# Patient Record
Sex: FEMALE | Race: WHITE | NOT HISPANIC OR LATINO | Employment: OTHER | URBAN - METROPOLITAN AREA
[De-identification: names, ages, dates, MRNs, and addresses within clinical notes are randomized per-mention and may not be internally consistent; named-entity substitution may affect disease eponyms.]

---

## 2017-08-11 ENCOUNTER — OFFICE VISIT (OUTPATIENT)
Dept: URGENT CARE | Facility: CLINIC | Age: 62
End: 2017-08-11
Payer: COMMERCIAL

## 2017-08-11 ENCOUNTER — HOSPITAL ENCOUNTER (OUTPATIENT)
Facility: MEDICAL CENTER | Age: 62
End: 2017-08-11
Attending: PHYSICIAN ASSISTANT
Payer: COMMERCIAL

## 2017-08-11 VITALS
DIASTOLIC BLOOD PRESSURE: 84 MMHG | WEIGHT: 110 LBS | HEART RATE: 84 BPM | OXYGEN SATURATION: 95 % | SYSTOLIC BLOOD PRESSURE: 118 MMHG | HEIGHT: 65 IN | BODY MASS INDEX: 18.33 KG/M2 | RESPIRATION RATE: 14 BRPM | TEMPERATURE: 98.6 F

## 2017-08-11 DIAGNOSIS — N30.01 ACUTE CYSTITIS WITH HEMATURIA: ICD-10-CM

## 2017-08-11 DIAGNOSIS — F17.200 SMOKER: ICD-10-CM

## 2017-08-11 PROCEDURE — 87186 SC STD MICRODIL/AGAR DIL: CPT

## 2017-08-11 PROCEDURE — 99406 BEHAV CHNG SMOKING 3-10 MIN: CPT | Performed by: PHYSICIAN ASSISTANT

## 2017-08-11 PROCEDURE — 87086 URINE CULTURE/COLONY COUNT: CPT

## 2017-08-11 PROCEDURE — 99203 OFFICE O/P NEW LOW 30 MIN: CPT | Mod: 25 | Performed by: PHYSICIAN ASSISTANT

## 2017-08-11 PROCEDURE — 87077 CULTURE AEROBIC IDENTIFY: CPT

## 2017-08-11 RX ORDER — CEFDINIR 300 MG/1
300 CAPSULE ORAL EVERY 12 HOURS
Qty: 10 CAP | Refills: 0 | Status: SHIPPED | OUTPATIENT
Start: 2017-08-11 | End: 2017-08-16

## 2017-08-11 RX ORDER — ESTRADIOL 0.1 MG/G
CREAM VAGINAL DAILY
COMMUNITY
End: 2020-10-19 | Stop reason: SDUPTHER

## 2017-08-11 ASSESSMENT — ENCOUNTER SYMPTOMS
CHILLS: 0
HEADACHES: 0
EYE DISCHARGE: 0
EYE REDNESS: 0
SORE THROAT: 0
ROS GI COMMENTS: SUPRAPUBIC PRESSURE
VOMITING: 0
NECK PAIN: 0
FLANK PAIN: 0
COUGH: 0
WHEEZING: 0
MYALGIAS: 0
NAUSEA: 0
ABDOMINAL PAIN: 0
FATIGUE: 0
BACK PAIN: 0
VISUAL CHANGE: 0
FEVER: 0

## 2017-08-11 ASSESSMENT — PAIN SCALES - GENERAL: PAINLEVEL: NO PAIN

## 2017-08-11 NOTE — MR AVS SNAPSHOT
"        Pamela Watkins   2017 2:15 PM   Office Visit   MRN: [id number]    Department:  [facility]   Dept Phone:  [phone]    Description:  Female : 1955   Provider:  Papo Doran PA-C           Reason for Visit     UTI x5 days poss UTI      Allergies as of 2017     No Known Allergies      You were diagnosed with     Acute cystitis with hematuria   [805898]         Vital Signs     Blood Pressure Pulse Temperature Respirations Height Weight    118/84 mmHg 84 37 °C (98.6 °F) 14 1.651 m (5' 5\") 49.896 kg (110 lb)    Body Mass Index Oxygen Saturation Breastfeeding? Smoking Status          18.31 kg/m2 95% No Current Every Day Smoker        Basic Information     Date Of Birth Sex Race Ethnicity Preferred Language    1955 Female White Non- English      Health Maintenance     Patient has no pending health maintenance at this time      Current Immunizations     No immunizations on file.      Below and/or attached are the medications your provider expects you to take. Review all of your home medications and newly ordered medications with your provider and/or pharmacist. Follow medication instructions as directed by your provider and/or pharmacist. Please keep your medication list with you and share with your provider. Update the information when medications are discontinued, doses are changed, or new medications (including over-the-counter products) are added; and carry medication information at all times in the event of emergency situations     Allergies:  No Known Allergies          Medications  Valid as of: 2017 -  3:02 PM    Generic Name Brand Name Tablet Size Instructions for use    Cefdinir (Cap) OMNICEF 300 MG Take 1 Cap by mouth every 12 hours for 5 days.        Estradiol (Cream) ESTRACE 0.1 MG/GM Insert  in vagina every day.        .                 Medicines prescribed today were sent to:     [facility]ARM[facility]Y #[id number] - [place], [place] - [address]    [address] [facility] [address] [place] [address]    Phone: [phone] Fax: [phone]    Open 24 Hours?: No      Medication refill instructions:       If your prescription bottle indicates you have medication refills left, it is not necessary to call your provider’s office. Please contact your pharmacy and they will refill your medication.    If your prescription bottle indicates you do not have any refills left, you may request refills at any time through one of the following ways: The online USMD system (except Urgent Care), by calling your provider’s office, or by asking your pharmacy to contact your provider’s office with a refill request. Medication refills are processed only during regular business hours and may not be available until the next business day. Your provider may request additional information or to have a follow-up visit with you prior to refilling your medication.   *Please Note: Medication refills are assigned a new Rx number when refilled electronically. Your pharmacy may indicate that no refills were authorized even though a new prescription for the same medication is available at the pharmacy. Please request the medicine by name with the pharmacy before contacting your provider for a refill.        Your To Do List     Future Labs/Procedures Complete By Expires    Urine Culture  As directed 8/11/2018         USMD Access Code: [email or web address]  Expires: 9/10/2017  3:02 PM    Your email address is not on file at [facility].  Email Addresses are required for you to sign up for USMD, please contact [phone] to verify your personal information and to provide your email address prior to attempting to register for USMD.    Pamela Watkins  [address] [place] [address]    USMD  A secure, online tool to manage your health information     [facility]’s USMD® is a secure, online tool that connects you to your personalized health information from the privacy of your home -- day or night -  making it very easy for you to manage your healthcare. Once the activation process is completed, you can even access your medical information using the [employer] citlaly, which is available for free in the Apple Citlaly store or Google Play store.     To learn more about [employer], visit [email or web address]    There are two levels of access available (as shown below):   My Chart Features  [facility] Doctor [facility]  Specialists [facility]  Urgent  Care Non-[facility] Primary Care Doctor   Email your healthcare team securely and privately 24/7 X X X    Manage appointments: schedule your next appointment; view details of past/upcoming appointments X      Request prescription refills. X      View recent personal medical records, including lab and immunizations X X X X   View health record, including health history, allergies, medications X X X X   Read reports about your outpatient visits, procedures, consult and ER notes X X X X   See your discharge summary, which is a recap of your hospital and/or ER visit that includes your diagnosis, lab results, and care plan X X  X     How to register for [employer]:  Once your e-mail address has been verified, follow the following steps to sign up for [employer].     1. Go to  [email or web address]  2. Click on the Sign Up Now box, which takes you to the New Member Sign Up page. You will need to provide the following information:  a. Enter your [employer] Access Code exactly as it appears at the top of this page. (You will not need to use this code after you’ve completed the sign-up process. If you do not sign up before the expiration date, you must request a new code.)   b. Enter your date of birth.   c. Enter your home email address.   d. Click Submit, and follow the next screen’s instructions.  3. Create a Humble Bundlet ID. This will be your [employer] login ID and cannot be changed, so think of one that is secure and easy to remember.  4. Create a Hum[employer]let password. You can change your password at  any time.  5. Enter your Password Reset Question and Answer. This can be used at a later time if you forget your password.   6. Enter your e-mail address. This allows you to receive e-mail notifications when new information is available in [employer].  7. Click Sign Up. You can now view your health information.    For assistance activating your [employer] account, call [phone]         Quit Tobacco Information     Do you want to quit using tobacco?    Quitting tobacco decreases risks of cancer, heart and lung disease, increases life expectancy, improves sense of taste and smell, and increases spending money, among other benefits.    If you are thinking about quitting, we can help.  • [facility] Quit Tobacco Program: [phone]  o Program occurs weekly for four weeks and includes pharmacist consultation on products to support quitting smoking or chewing tobacco. A provider referral is needed for pharmacist consultation.  • Tobacco Users Help Hotline: 2-181-QUIT-NOW (165-1327) or https://[place].quitlogix.org/  o Free, confidential telephone and online coaching for [place] residents. Sessions are designed on a schedule that is convenient for you. Eligible clients receive free nicotine replacement therapy.  • Nationally: www.smokefree.gov  o Information and professional assistance to support both immediate and long-term needs as you become, and remain, a non-smoker. Smokefree.gov allows you to choose the help that best fits your needs.

## 2017-08-12 NOTE — PROGRESS NOTES
"Subjective:      Pamela Watkins is a 62 y.o. female who presents with UTI            UTI  This is a new problem. Episode onset: 5-6 days ago. The problem occurs constantly. The problem has been gradually worsening. Associated symptoms include urinary symptoms. Pertinent negatives include no abdominal pain, chest pain, chills, congestion, coughing, fatigue, fever, headaches, myalgias, nausea, neck pain, rash, sore throat, visual change or vomiting. Nothing aggravates the symptoms. Treatments tried: Azo.       Review of Systems   Constitutional: Negative for fever, chills, malaise/fatigue and fatigue.   HENT: Negative for congestion and sore throat.    Eyes: Negative for discharge and redness.   Respiratory: Negative for cough and wheezing.    Cardiovascular: Negative for chest pain and leg swelling.   Gastrointestinal: Negative for nausea, vomiting and abdominal pain.        Suprapubic pressure   Genitourinary: Positive for dysuria, urgency and frequency. Negative for hematuria and flank pain.   Musculoskeletal: Negative for myalgias, back pain and neck pain.   Skin: Negative for itching and rash.   Neurological: Negative for headaches.          Objective:     /84 mmHg  Pulse 84  Temp(Src) 37 °C (98.6 °F)  Resp 14  Ht 1.651 m (5' 5\")  Wt 49.896 kg (110 lb)  BMI 18.31 kg/m2  SpO2 95%  Breastfeeding? No   PMH:  has no past medical history on file.  MEDS:   Current outpatient prescriptions:   •  estradiol (ESTRACE) 0.1 MG/GM vaginal cream, Insert  in vagina every day., Disp: , Rfl:   •  cefdinir (OMNICEF) 300 MG Cap, Take 1 Cap by mouth every 12 hours for 5 days., Disp: 10 Cap, Rfl: 0  ALLERGIES: No Known Allergies  SURGHX: History reviewed. No pertinent past surgical history.  SOCHX:  reports that she has been smoking Cigarettes.  She has been smoking about 0.50 packs per day. She has never used smokeless tobacco. She reports that she does not drink alcohol or use illicit drugs.  FH: Family history was " reviewed, no pertinent findings to report    Physical Exam   Constitutional: She is oriented to person, place, and time. She appears well-developed.   HENT:   Head: Normocephalic and atraumatic.   Eyes: EOM are normal. Pupils are equal, round, and reactive to light.   Neck: Normal range of motion. Neck supple.   Cardiovascular: Normal rate and regular rhythm.    No murmur heard.  Pulmonary/Chest: Effort normal and breath sounds normal. No respiratory distress.   Abdominal: Soft. Bowel sounds are normal. She exhibits no distension.   Minimal suprapubic tenderness. Negative CVAT.    Musculoskeletal: Normal range of motion. She exhibits no edema.   Neurological: She is alert and oriented to person, place, and time.   Skin: Skin is warm and dry.   Psychiatric: She has a normal mood and affect. Her behavior is normal.   Vitals reviewed.           UA: Large LE, nitrate pos, greater than 2000 protein, moderate blood.   Sent for culture.     Assessment/Plan:     1. Acute cystitis with hematuria [N30.01]  - Urine Culture; Future  - cefdinir (OMNICEF) 300 MG Cap; Take 1 Cap by mouth every 12 hours for 5 days.  Dispense: 10 Cap; Refill: 0    2. Smoker  Smoking cessation was discussed today for longer than 3 min. OTC Smoking cessation aids were discussed and pt. will consider such, however is not ready to quit at this time.     Pt. Was given ABX therapy today and will change therapy if culture indicates this is necessary. ER precautions given- worsening symptoms, back pain, abd. Pain, or fevers. Pt. Without any evidence of pyelonephritis at this time- encouraged pt. To have her urine rechecked at her upcoming regular appt. With her PCP - to ensure infection- but protein and blood have cleared.   Pt. Is to increase fluids, and take the complete duration of the therapy.   Pt. Understands and agrees with the plan.   F/U with PCP in 3-4 days as needed.

## 2017-08-14 LAB
BACTERIA UR CULT: ABNORMAL
SIGNIFICANT IND 70042: ABNORMAL
SOURCE SOURCE: ABNORMAL

## 2020-09-21 ENCOUNTER — TELEPHONE (OUTPATIENT)
Dept: SCHEDULING | Facility: IMAGING CENTER | Age: 65
End: 2020-09-21

## 2020-10-19 ENCOUNTER — OFFICE VISIT (OUTPATIENT)
Dept: MEDICAL GROUP | Facility: PHYSICIAN GROUP | Age: 65
End: 2020-10-19
Payer: MEDICARE

## 2020-10-19 VITALS
OXYGEN SATURATION: 95 % | DIASTOLIC BLOOD PRESSURE: 62 MMHG | SYSTOLIC BLOOD PRESSURE: 120 MMHG | WEIGHT: 110.4 LBS | HEIGHT: 65 IN | BODY MASS INDEX: 18.39 KG/M2 | RESPIRATION RATE: 12 BRPM | TEMPERATURE: 97.7 F | HEART RATE: 74 BPM

## 2020-10-19 DIAGNOSIS — F17.200 SMOKER: ICD-10-CM

## 2020-10-19 DIAGNOSIS — E78.2 MIXED HYPERLIPIDEMIA: ICD-10-CM

## 2020-10-19 DIAGNOSIS — Z12.31 ENCOUNTER FOR SCREENING MAMMOGRAM FOR BREAST CANCER: ICD-10-CM

## 2020-10-19 DIAGNOSIS — Z12.11 SCREENING FOR COLON CANCER: ICD-10-CM

## 2020-10-19 DIAGNOSIS — Z13.820 SCREENING FOR OSTEOPOROSIS: ICD-10-CM

## 2020-10-19 DIAGNOSIS — Z13.29 THYROID DISORDER SCREEN: ICD-10-CM

## 2020-10-19 DIAGNOSIS — Z00.00 PHYSICAL EXAM, ANNUAL: ICD-10-CM

## 2020-10-19 DIAGNOSIS — N95.2 VAGINAL ATROPHY: ICD-10-CM

## 2020-10-19 PROCEDURE — 99214 OFFICE O/P EST MOD 30 MIN: CPT | Performed by: PHYSICIAN ASSISTANT

## 2020-10-19 RX ORDER — ATORVASTATIN CALCIUM 40 MG/1
20 TABLET, FILM COATED ORAL DAILY
Qty: 45 TAB | Refills: 3 | Status: SHIPPED | OUTPATIENT
Start: 2020-10-19 | End: 2020-10-19

## 2020-10-19 RX ORDER — ESTRADIOL 0.1 MG/G
CREAM VAGINAL
Qty: 42 G | Refills: 2 | Status: SHIPPED | OUTPATIENT
Start: 2020-10-19 | End: 2021-07-12

## 2020-10-19 RX ORDER — ATORVASTATIN CALCIUM 40 MG/1
20 TABLET, FILM COATED ORAL NIGHTLY
COMMUNITY
End: 2020-10-19 | Stop reason: SDUPTHER

## 2020-10-19 RX ORDER — ATORVASTATIN CALCIUM 20 MG/1
20 TABLET, FILM COATED ORAL DAILY
Qty: 90 TAB | Refills: 3 | Status: SHIPPED | OUTPATIENT
Start: 2020-10-19 | End: 2021-10-12

## 2020-10-19 ASSESSMENT — PATIENT HEALTH QUESTIONNAIRE - PHQ9: CLINICAL INTERPRETATION OF PHQ2 SCORE: 0

## 2020-10-19 NOTE — PROGRESS NOTES
CC:    Chief Complaint   Patient presents with   • Establish Care   • Medication Refill       HISTORY OF THE PRESENT ILLNESS: Patient is a 65 y.o. female presenting to [facility] primary care. Pt spends half of the year in [place].     1. Pt on lipitor 20mg for HLD.   2. Pt on estrace for vaginal atrophy.   3. Pt declines vaccines.   4. Pt stopped doing mammos about 10 years ago.   5. Pt's last colon CA screening at age 50.   6. Pt went into menopause at age 33.       Allergies: Patient has no known allergies.    Current Outpatient Medications Ordered in Epic   Medication Sig Dispense Refill   • atorvastatin (LIPITOR) 40 MG Tab Take 20 mg by mouth every evening.     • estradiol (ESTRACE) 0.1 MG/GM vaginal cream Insert  in vagina every day.       No current [facility]-ordered facility-administered medications on file.        Past Medical History:   Diagnosis Date   • Hyperlipidemia        Past Surgical History:   Procedure Laterality Date   • TUBAL LIGATION         Social History     Tobacco Use   • Smoking status: Current Every Day Smoker     Packs/day: 0.50     Years: 50.00     Pack years: 25.00     Types: Cigarettes   • Smokeless tobacco: Never Used   Substance Use Topics   • Alcohol use: No   • Drug use: No       Social History     Social History Narrative   • Not on file       History reviewed. No pertinent family history.    ROS:     - Constitutional: Negative for fever, chills, unexpected weight change, and fatigue/generalized weakness.     - HEENT: Negative for headaches, vision changes, hearing changes, ear pain, ear discharge, rhinorrhea, sinus congestion, sore throat, and neck pain.      - Respiratory: Negative for cough, sputum production, chest congestion, dyspnea, wheezing, and crackles.      - Cardiovascular: Negative for chest pain, palpitations, orthopnea, and bilateral lower extremity edema.     - Gastrointestinal: Negative for heartburn, nausea, vomiting, abdominal pain, hematochezia, melena, diarrhea,  "constipation, and greasy/foul-smelling stools.     - Genitourinary: Negative for dysuria, polyuria, hematuria, pyuria, urinary urgency, and urinary incontinence.     - Musculoskeletal: Negative for myalgias, back pain, and joint pain.     - Skin: Negative for rash, itching, cyanotic skin color change.     - Neurological: Positive for occasional vertigo. Negative for dizziness, tingling, tremors, focal sensory deficit, focal weakness and headaches.     - Endo/Heme/Allergies: Does not bruise/bleed easily.     - Psychiatric/Behavioral: Negative for depression, suicidal/homicidal ideation and memory loss.        - NOTE: All other systems reviewed and are negative, except as in HPI.            Exam: /62 (BP Location: Left arm, Patient Position: Sitting, BP Cuff Size: Adult)   Pulse 74   Temp 36.5 °C (97.7 °F) (Temporal)   Resp 12   Ht 1.651 m (5' 5\")   Wt 50.1 kg (110 lb 6.4 oz)   SpO2 95%  Body mass index is 18.37 kg/m².    General: Normal appearing. No acute distress.  Skin: Warm and dry.  No obvious lesions.  HEENT: Normocephalic. Eyes conjunctiva clear lids without ptosis, ears normal shape and contour  Cardiovascular: Regular rate and rhythm without murmur.   Respiratory: Clear to auscultation bilaterally, no rhonchi wheezing or rales.  Neurologic: Grossly nonfocal, A&O x3, gait normal,  Musculoskeletal: No deformity or swelling.   Extremities: No extremity cyanosis, clubbing, or edema.  Psych: Normal mood and affect. Judgment and insight is normal.    Please note that this dictation was created using voice recognition software. I have made every reasonable attempt to correct obvious errors, but I expect that there are errors of grammar and possibly content that I did not discover before finalizing the note.      Assessment/Plan  1. Screening for colon cancer  Cologuard order placed  - COLOGUARD (FIT DNA)    2. Vaginal atrophy  Refill of Estrace sent in.  - estradiol (ESTRACE) 0.1 MG/GM vaginal cream; " Apply 1 gram vaginally 1 to 3 times per week  Dispense: 42 g; Refill: 2    3. Encounter for screening mammogram for breast cancer  Order sent  - MA-SCREENING MAMMO BILAT W/TOMOSYNTHESIS W/CAD; Future    4. Screening for osteoporosis  Order sent  - DS-BONE DENSITY STUDY (DEXA); Future    5. Mixed hyperlipidemia  We will check her lipids and follow-up as needed.  - CBC WITH DIFFERENTIAL; Future  - Comp Metabolic Panel; Future  - Lipid Profile; Future  - atorvastatin (LIPITOR) 20 MG Tab; Take 1 Tab by mouth every day.  Dispense: 90 Tab; Refill: 3    6. Thyroid disorder screen  Order sent  - TSH WITH REFLEX TO FT4; Future    7. Physical exam, annual  PE conducted  - CBC WITH DIFFERENTIAL; Future  - Comp Metabolic Panel; Future  - Lipid Profile; Future  - TSH WITH REFLEX TO FT4; Future

## 2020-12-02 ENCOUNTER — HOSPITAL ENCOUNTER (OUTPATIENT)
Dept: RADIOLOGY | Facility: MEDICAL CENTER | Age: 65
End: 2020-12-02
Attending: PHYSICIAN ASSISTANT
Payer: MEDICARE

## 2020-12-02 DIAGNOSIS — Z78.0 POSTMENOPAUSAL STATUS: ICD-10-CM

## 2020-12-02 DIAGNOSIS — Z12.31 ENCOUNTER FOR SCREENING MAMMOGRAM FOR BREAST CANCER: ICD-10-CM

## 2020-12-02 PROCEDURE — 77080 DXA BONE DENSITY AXIAL: CPT

## 2020-12-02 PROCEDURE — 77067 SCR MAMMO BI INCL CAD: CPT

## 2020-12-03 DIAGNOSIS — Z13.29 THYROID DISORDER SCREEN: ICD-10-CM

## 2020-12-03 DIAGNOSIS — E78.2 MIXED HYPERLIPIDEMIA: ICD-10-CM

## 2020-12-03 DIAGNOSIS — Z00.00 PHYSICAL EXAM, ANNUAL: ICD-10-CM

## 2021-03-03 DIAGNOSIS — Z23 NEED FOR VACCINATION: ICD-10-CM

## 2021-07-11 DIAGNOSIS — N95.2 VAGINAL ATROPHY: ICD-10-CM

## 2021-07-12 RX ORDER — ESTRADIOL 0.1 MG/G
CREAM VAGINAL
Qty: 42.5 G | Refills: 2 | Status: SHIPPED | OUTPATIENT
Start: 2021-07-12 | End: 2021-07-25

## 2021-07-25 ENCOUNTER — APPOINTMENT (OUTPATIENT)
Dept: RADIOLOGY | Facility: MEDICAL CENTER | Age: 66
DRG: 482 | End: 2021-07-25
Attending: EMERGENCY MEDICINE
Payer: MEDICARE

## 2021-07-25 ENCOUNTER — APPOINTMENT (OUTPATIENT)
Dept: RADIOLOGY | Facility: MEDICAL CENTER | Age: 66
DRG: 482 | End: 2021-07-25
Attending: ORTHOPAEDIC SURGERY
Payer: MEDICARE

## 2021-07-25 ENCOUNTER — ANESTHESIA (OUTPATIENT)
Dept: SURGERY | Facility: MEDICAL CENTER | Age: 66
DRG: 482 | End: 2021-07-25
Payer: MEDICARE

## 2021-07-25 ENCOUNTER — ANESTHESIA EVENT (OUTPATIENT)
Dept: SURGERY | Facility: MEDICAL CENTER | Age: 66
DRG: 482 | End: 2021-07-25
Payer: MEDICARE

## 2021-07-25 ENCOUNTER — HOSPITAL ENCOUNTER (INPATIENT)
Facility: MEDICAL CENTER | Age: 66
LOS: 3 days | DRG: 482 | End: 2021-07-28
Attending: EMERGENCY MEDICINE | Admitting: INTERNAL MEDICINE
Payer: MEDICARE

## 2021-07-25 DIAGNOSIS — S72.001A CLOSED FRACTURE OF RIGHT HIP, INITIAL ENCOUNTER (HCC): ICD-10-CM

## 2021-07-25 DIAGNOSIS — J43.9 PULMONARY EMPHYSEMA, UNSPECIFIED EMPHYSEMA TYPE (HCC): ICD-10-CM
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
## 2021-07-25 PROBLEM — S72.90XA FEMUR FRACTURE (HCC): Status: ACTIVE | Noted: 2021-07-25

## 2021-07-25 LAB
ALBUMIN SERPL BCP-MCNC: 4 G/DL (ref 3.2–4.9)
ALBUMIN/GLOB SERPL: 1.8 G/DL
ALP SERPL-CCNC: 78 U/L (ref 30–99)
ALT SERPL-CCNC: 17 U/L (ref 2–50)
ANION GAP SERPL CALC-SCNC: 9 MMOL/L (ref 7–16)
AST SERPL-CCNC: 22 U/L (ref 12–45)
BASOPHILS # BLD AUTO: 0.5 % (ref 0–1.8)
BASOPHILS # BLD: 0.04 K/UL (ref 0–0.12)
BILIRUB SERPL-MCNC: 0.3 MG/DL (ref 0.1–1.5)
BUN SERPL-MCNC: 18 MG/DL (ref 8–22)
CALCIUM SERPL-MCNC: 9 MG/DL (ref 8.5–10.5)
CHLORIDE SERPL-SCNC: 104 MMOL/L (ref 96–112)
CO2 SERPL-SCNC: 26 MMOL/L (ref 20–33)
CREAT SERPL-MCNC: 0.78 MG/DL (ref 0.5–1.4)
EOSINOPHIL # BLD AUTO: 0.07 K/UL (ref 0–0.51)
EOSINOPHIL NFR BLD: 0.8 % (ref 0–6.9)
ERYTHROCYTE [DISTWIDTH] IN BLOOD BY AUTOMATED COUNT: 44.5 FL (ref 35.9–50)
GLOBULIN SER CALC-MCNC: 2.2 G/DL (ref 1.9–3.5)
GLUCOSE SERPL-MCNC: 107 MG/DL (ref 65–99)
HCT VFR BLD AUTO: 38.1 % (ref 37–47)
HGB BLD-MCNC: 12.6 G/DL (ref 12–16)
IMM GRANULOCYTES # BLD AUTO: 0.06 K/UL (ref 0–0.11)
IMM GRANULOCYTES NFR BLD AUTO: 0.7 % (ref 0–0.9)
LYMPHOCYTES # BLD AUTO: 1.37 K/UL (ref 1–4.8)
LYMPHOCYTES NFR BLD: 15.5 % (ref 22–41)
MCH RBC QN AUTO: 30.6 PG (ref 27–33)
MCHC RBC AUTO-ENTMCNC: 33.1 G/DL (ref 33.6–35)
MCV RBC AUTO: 92.5 FL (ref 81.4–97.8)
MONOCYTES # BLD AUTO: 0.67 K/UL (ref 0–0.85)
MONOCYTES NFR BLD AUTO: 7.6 % (ref 0–13.4)
NEUTROPHILS # BLD AUTO: 6.65 K/UL (ref 2–7.15)
NEUTROPHILS NFR BLD: 74.9 % (ref 44–72)
NRBC # BLD AUTO: 0 K/UL
NRBC BLD-RTO: 0 /100 WBC
PLATELET # BLD AUTO: 229 K/UL (ref 164–446)
PMV BLD AUTO: 10 FL (ref 9–12.9)
POTASSIUM SERPL-SCNC: 3.8 MMOL/L (ref 3.6–5.5)
PROT SERPL-MCNC: 6.2 G/DL (ref 6–8.2)
RBC # BLD AUTO: 4.12 M/UL (ref 4.2–5.4)
SARS-COV+SARS-COV-2 AG RESP QL IA.RAPID: NOTDETECTED
SODIUM SERPL-SCNC: 139 MMOL/L (ref 135–145)
SPECIMEN SOURCE: NORMAL
WBC # BLD AUTO: 8.9 K/UL (ref 4.8–10.8)

## 2021-07-25 PROCEDURE — 87426 SARSCOV CORONAVIRUS AG IA: CPT

## 2021-07-25 PROCEDURE — 160009 HCHG ANES TIME/MIN: Performed by: ORTHOPAEDIC SURGERY

## 2021-07-25 PROCEDURE — 27245 TREAT THIGH FRACTURE: CPT | Mod: RT | Performed by: ORTHOPAEDIC SURGERY

## 2021-07-25 PROCEDURE — 700111 HCHG RX REV CODE 636 W/ 250 OVERRIDE (IP): Performed by: ORTHOPAEDIC SURGERY

## 2021-07-25 PROCEDURE — 160002 HCHG RECOVERY MINUTES (STAT): Performed by: ORTHOPAEDIC SURGERY

## 2021-07-25 PROCEDURE — A9270 NON-COVERED ITEM OR SERVICE: HCPCS | Performed by: ORTHOPAEDIC SURGERY

## 2021-07-25 PROCEDURE — A9270 NON-COVERED ITEM OR SERVICE: HCPCS | Performed by: ANESTHESIOLOGY

## 2021-07-25 PROCEDURE — 99221 1ST HOSP IP/OBS SF/LOW 40: CPT | Mod: AI | Performed by: INTERNAL MEDICINE

## 2021-07-25 PROCEDURE — 160035 HCHG PACU - 1ST 60 MINS PHASE I: Performed by: ORTHOPAEDIC SURGERY

## 2021-07-25 PROCEDURE — 85025 COMPLETE CBC W/AUTO DIFF WBC: CPT

## 2021-07-25 PROCEDURE — 73552 X-RAY EXAM OF FEMUR 2/>: CPT | Mod: RT

## 2021-07-25 PROCEDURE — 27245 TREAT THIGH FRACTURE: CPT | Mod: 80ROC,RT | Performed by: ORTHOPAEDIC SURGERY

## 2021-07-25 PROCEDURE — 700101 HCHG RX REV CODE 250: Performed by: ANESTHESIOLOGY

## 2021-07-25 PROCEDURE — 500891 HCHG PACK, ORTHO MAJOR: Performed by: ORTHOPAEDIC SURGERY

## 2021-07-25 PROCEDURE — 700111 HCHG RX REV CODE 636 W/ 250 OVERRIDE (IP): Performed by: EMERGENCY MEDICINE

## 2021-07-25 PROCEDURE — 700111 HCHG RX REV CODE 636 W/ 250 OVERRIDE (IP): Performed by: ANESTHESIOLOGY

## 2021-07-25 PROCEDURE — 700105 HCHG RX REV CODE 258: Performed by: INTERNAL MEDICINE

## 2021-07-25 PROCEDURE — 72170 X-RAY EXAM OF PELVIS: CPT

## 2021-07-25 PROCEDURE — 160041 HCHG SURGERY MINUTES - EA ADDL 1 MIN LEVEL 4: Performed by: ORTHOPAEDIC SURGERY

## 2021-07-25 PROCEDURE — 99222 1ST HOSP IP/OBS MODERATE 55: CPT | Mod: 57 | Performed by: ORTHOPAEDIC SURGERY

## 2021-07-25 PROCEDURE — 501838 HCHG SUTURE GENERAL: Performed by: ORTHOPAEDIC SURGERY

## 2021-07-25 PROCEDURE — 160029 HCHG SURGERY MINUTES - 1ST 30 MINS LEVEL 4: Performed by: ORTHOPAEDIC SURGERY

## 2021-07-25 PROCEDURE — 700102 HCHG RX REV CODE 250 W/ 637 OVERRIDE(OP): Performed by: ANESTHESIOLOGY

## 2021-07-25 PROCEDURE — 770006 HCHG ROOM/CARE - MED/SURG/GYN SEMI*

## 2021-07-25 PROCEDURE — C1713 ANCHOR/SCREW BN/BN,TIS/BN: HCPCS | Performed by: ORTHOPAEDIC SURGERY

## 2021-07-25 PROCEDURE — 160036 HCHG PACU - EA ADDL 30 MINS PHASE I: Performed by: ORTHOPAEDIC SURGERY

## 2021-07-25 PROCEDURE — 99291 CRITICAL CARE FIRST HOUR: CPT

## 2021-07-25 PROCEDURE — 0QS636Z REPOSITION RIGHT UPPER FEMUR WITH INTRAMEDULLARY INTERNAL FIXATION DEVICE, PERCUTANEOUS APPROACH: ICD-10-PCS | Performed by: ORTHOPAEDIC SURGERY

## 2021-07-25 PROCEDURE — 160048 HCHG OR STATISTICAL LEVEL 1-5: Performed by: ORTHOPAEDIC SURGERY

## 2021-07-25 PROCEDURE — 80053 COMPREHEN METABOLIC PANEL: CPT

## 2021-07-25 PROCEDURE — 96374 THER/PROPH/DIAG INJ IV PUSH: CPT

## 2021-07-25 PROCEDURE — 36415 COLL VENOUS BLD VENIPUNCTURE: CPT

## 2021-07-25 PROCEDURE — 93005 ELECTROCARDIOGRAM TRACING: CPT | Performed by: ORTHOPAEDIC SURGERY

## 2021-07-25 PROCEDURE — 700102 HCHG RX REV CODE 250 W/ 637 OVERRIDE(OP): Performed by: ORTHOPAEDIC SURGERY

## 2021-07-25 PROCEDURE — 96375 TX/PRO/DX INJ NEW DRUG ADDON: CPT

## 2021-07-25 PROCEDURE — 110371 HCHG SHELL REV 272: Performed by: ORTHOPAEDIC SURGERY

## 2021-07-25 DEVICE — NAIL HIP 127.5 DEGREE 10MM X 180MM (4TX2=8): Type: IMPLANTABLE DEVICE | Site: HIP | Status: FUNCTIONAL

## 2021-07-25 DEVICE — SCREW CROSS LOCK 5MM X 27.5MM (4TX5=20): Type: IMPLANTABLE DEVICE | Site: HIP | Status: FUNCTIONAL

## 2021-07-25 DEVICE — SCREW LAG 10.5MM X 95MM (4TX2=8): Type: IMPLANTABLE DEVICE | Site: HIP | Status: FUNCTIONAL

## 2021-07-25 RX ORDER — ACETAMINOPHEN 325 MG/1
650 TABLET ORAL EVERY 6 HOURS PRN
Status: DISCONTINUED | OUTPATIENT
Start: 2021-07-25 | End: 2021-07-26

## 2021-07-25 RX ORDER — CEFAZOLIN SODIUM 1 G/3ML
INJECTION, POWDER, FOR SOLUTION INTRAMUSCULAR; INTRAVENOUS PRN
Status: DISCONTINUED | OUTPATIENT
Start: 2021-07-25 | End: 2021-07-25 | Stop reason: SURG

## 2021-07-25 RX ORDER — BISACODYL 10 MG
10 SUPPOSITORY, RECTAL RECTAL
Status: DISCONTINUED | OUTPATIENT
Start: 2021-07-25 | End: 2021-07-28 | Stop reason: HOSPADM

## 2021-07-25 RX ORDER — OXYCODONE HCL 5 MG/5 ML
10 SOLUTION, ORAL ORAL
Status: COMPLETED | OUTPATIENT
Start: 2021-07-25 | End: 2021-07-25

## 2021-07-25 RX ORDER — LIDOCAINE HYDROCHLORIDE 20 MG/ML
INJECTION, SOLUTION EPIDURAL; INFILTRATION; INTRACAUDAL; PERINEURAL PRN
Status: DISCONTINUED | OUTPATIENT
Start: 2021-07-25 | End: 2021-07-25 | Stop reason: SURG

## 2021-07-25 RX ORDER — ONDANSETRON 2 MG/ML
4 INJECTION INTRAMUSCULAR; INTRAVENOUS ONCE
Status: COMPLETED | OUTPATIENT
Start: 2021-07-25 | End: 2021-07-25

## 2021-07-25 RX ORDER — HYDROMORPHONE HYDROCHLORIDE 1 MG/ML
0.5 INJECTION, SOLUTION INTRAMUSCULAR; INTRAVENOUS; SUBCUTANEOUS
Status: DISCONTINUED | OUTPATIENT
Start: 2021-07-25 | End: 2021-07-25

## 2021-07-25 RX ORDER — AMOXICILLIN 250 MG
2 CAPSULE ORAL 2 TIMES DAILY
Status: DISCONTINUED | OUTPATIENT
Start: 2021-07-26 | End: 2021-07-28 | Stop reason: HOSPADM

## 2021-07-25 RX ORDER — PHENYLEPHRINE HCL IN 0.9% NACL 0.5 MG/5ML
SYRINGE (ML) INTRAVENOUS PRN
Status: DISCONTINUED | OUTPATIENT
Start: 2021-07-25 | End: 2021-07-25 | Stop reason: SURG

## 2021-07-25 RX ORDER — SODIUM CHLORIDE, SODIUM LACTATE, POTASSIUM CHLORIDE, CALCIUM CHLORIDE 600; 310; 30; 20 MG/100ML; MG/100ML; MG/100ML; MG/100ML
INJECTION, SOLUTION INTRAVENOUS CONTINUOUS
Status: DISCONTINUED | OUTPATIENT
Start: 2021-07-25 | End: 2021-07-25 | Stop reason: HOSPADM

## 2021-07-25 RX ORDER — DEXAMETHASONE SODIUM PHOSPHATE 4 MG/ML
INJECTION, SOLUTION INTRA-ARTICULAR; INTRALESIONAL; INTRAMUSCULAR; INTRAVENOUS; SOFT TISSUE PRN
Status: DISCONTINUED | OUTPATIENT
Start: 2021-07-25 | End: 2021-07-25 | Stop reason: SURG

## 2021-07-25 RX ORDER — ATORVASTATIN CALCIUM 20 MG/1
20 TABLET, FILM COATED ORAL EVERY EVENING
Status: DISCONTINUED | OUTPATIENT
Start: 2021-07-25 | End: 2021-07-28 | Stop reason: HOSPADM

## 2021-07-25 RX ORDER — POLYETHYLENE GLYCOL 3350 17 G/17G
1 POWDER, FOR SOLUTION ORAL
Status: DISCONTINUED | OUTPATIENT
Start: 2021-07-25 | End: 2021-07-28 | Stop reason: HOSPADM

## 2021-07-25 RX ORDER — HYDROMORPHONE HYDROCHLORIDE 1 MG/ML
0.2 INJECTION, SOLUTION INTRAMUSCULAR; INTRAVENOUS; SUBCUTANEOUS
Status: DISCONTINUED | OUTPATIENT
Start: 2021-07-25 | End: 2021-07-25 | Stop reason: HOSPADM

## 2021-07-25 RX ORDER — SODIUM CHLORIDE, SODIUM LACTATE, POTASSIUM CHLORIDE, CALCIUM CHLORIDE 600; 310; 30; 20 MG/100ML; MG/100ML; MG/100ML; MG/100ML
INJECTION, SOLUTION INTRAVENOUS CONTINUOUS
Status: DISCONTINUED | OUTPATIENT
Start: 2021-07-25 | End: 2021-07-26

## 2021-07-25 RX ORDER — HYDROMORPHONE HYDROCHLORIDE 1 MG/ML
0.25 INJECTION, SOLUTION INTRAMUSCULAR; INTRAVENOUS; SUBCUTANEOUS
Status: DISCONTINUED | OUTPATIENT
Start: 2021-07-25 | End: 2021-07-28 | Stop reason: HOSPADM

## 2021-07-25 RX ORDER — CEFAZOLIN SODIUM 2 G/100ML
2 INJECTION, SOLUTION INTRAVENOUS EVERY 8 HOURS
Status: COMPLETED | OUTPATIENT
Start: 2021-07-25 | End: 2021-07-26

## 2021-07-25 RX ORDER — OXYCODONE HCL 5 MG/5 ML
5 SOLUTION, ORAL ORAL
Status: COMPLETED | OUTPATIENT
Start: 2021-07-25 | End: 2021-07-25

## 2021-07-25 RX ORDER — HALOPERIDOL 5 MG/ML
1 INJECTION INTRAMUSCULAR
Status: DISCONTINUED | OUTPATIENT
Start: 2021-07-25 | End: 2021-07-25 | Stop reason: HOSPADM

## 2021-07-25 RX ORDER — OXYCODONE HYDROCHLORIDE 5 MG/1
5 TABLET ORAL
Status: DISCONTINUED | OUTPATIENT
Start: 2021-07-25 | End: 2021-07-28 | Stop reason: HOSPADM

## 2021-07-25 RX ORDER — ONDANSETRON 2 MG/ML
4 INJECTION INTRAMUSCULAR; INTRAVENOUS
Status: DISCONTINUED | OUTPATIENT
Start: 2021-07-25 | End: 2021-07-25 | Stop reason: HOSPADM

## 2021-07-25 RX ORDER — MIDAZOLAM HYDROCHLORIDE 1 MG/ML
1 INJECTION INTRAMUSCULAR; INTRAVENOUS
Status: DISCONTINUED | OUTPATIENT
Start: 2021-07-25 | End: 2021-07-25 | Stop reason: HOSPADM

## 2021-07-25 RX ORDER — HYDROMORPHONE HYDROCHLORIDE 1 MG/ML
0.1 INJECTION, SOLUTION INTRAMUSCULAR; INTRAVENOUS; SUBCUTANEOUS
Status: DISCONTINUED | OUTPATIENT
Start: 2021-07-25 | End: 2021-07-25 | Stop reason: HOSPADM

## 2021-07-25 RX ORDER — MEPERIDINE HYDROCHLORIDE 25 MG/ML
12.5 INJECTION INTRAMUSCULAR; INTRAVENOUS; SUBCUTANEOUS
Status: DISCONTINUED | OUTPATIENT
Start: 2021-07-25 | End: 2021-07-25 | Stop reason: HOSPADM

## 2021-07-25 RX ORDER — LORAZEPAM 2 MG/ML
0.5 INJECTION INTRAMUSCULAR ONCE
Status: COMPLETED | OUTPATIENT
Start: 2021-07-25 | End: 2021-07-25

## 2021-07-25 RX ORDER — DIPHENHYDRAMINE HYDROCHLORIDE 50 MG/ML
12.5 INJECTION INTRAMUSCULAR; INTRAVENOUS
Status: DISCONTINUED | OUTPATIENT
Start: 2021-07-25 | End: 2021-07-25 | Stop reason: HOSPADM

## 2021-07-25 RX ORDER — HYDROMORPHONE HYDROCHLORIDE 1 MG/ML
0.4 INJECTION, SOLUTION INTRAMUSCULAR; INTRAVENOUS; SUBCUTANEOUS
Status: DISCONTINUED | OUTPATIENT
Start: 2021-07-25 | End: 2021-07-25 | Stop reason: HOSPADM

## 2021-07-25 RX ORDER — ONDANSETRON 2 MG/ML
INJECTION INTRAMUSCULAR; INTRAVENOUS PRN
Status: DISCONTINUED | OUTPATIENT
Start: 2021-07-25 | End: 2021-07-25 | Stop reason: SURG

## 2021-07-25 RX ORDER — OXYCODONE HYDROCHLORIDE 5 MG/1
2.5 TABLET ORAL
Status: DISCONTINUED | OUTPATIENT
Start: 2021-07-25 | End: 2021-07-28 | Stop reason: HOSPADM

## 2021-07-25 RX ADMIN — EPHEDRINE SULFATE 10 MG: 50 INJECTION, SOLUTION INTRAVENOUS at 18:04

## 2021-07-25 RX ADMIN — LIDOCAINE HYDROCHLORIDE 30 MG: 20 INJECTION, SOLUTION EPIDURAL; INFILTRATION; INTRACAUDAL at 17:39

## 2021-07-25 RX ADMIN — SUGAMMADEX 200 MG: 100 INJECTION, SOLUTION INTRAVENOUS at 18:31

## 2021-07-25 RX ADMIN — EPHEDRINE SULFATE 5 MG: 50 INJECTION, SOLUTION INTRAVENOUS at 18:24

## 2021-07-25 RX ADMIN — HYDROMORPHONE HYDROCHLORIDE 0.5 MG: 1 INJECTION, SOLUTION INTRAMUSCULAR; INTRAVENOUS; SUBCUTANEOUS at 12:28

## 2021-07-25 RX ADMIN — Medication 100 MCG: at 18:02

## 2021-07-25 RX ADMIN — ROCURONIUM BROMIDE 50 MG: 10 INJECTION, SOLUTION INTRAVENOUS at 17:39

## 2021-07-25 RX ADMIN — DEXAMETHASONE SODIUM PHOSPHATE 4 MG: 4 INJECTION, SOLUTION INTRA-ARTICULAR; INTRALESIONAL; INTRAMUSCULAR; INTRAVENOUS; SOFT TISSUE at 18:07

## 2021-07-25 RX ADMIN — OXYCODONE HYDROCHLORIDE 5 MG: 5 SOLUTION ORAL at 19:11

## 2021-07-25 RX ADMIN — Medication 100 MCG: at 17:58

## 2021-07-25 RX ADMIN — CEFAZOLIN SODIUM 2 G: 2 INJECTION, SOLUTION INTRAVENOUS at 21:30

## 2021-07-25 RX ADMIN — PROPOFOL 150 MG: 10 INJECTION, EMULSION INTRAVENOUS at 17:39

## 2021-07-25 RX ADMIN — CEFAZOLIN 1.5 G: 330 INJECTION, POWDER, FOR SOLUTION INTRAMUSCULAR; INTRAVENOUS at 17:47

## 2021-07-25 RX ADMIN — SODIUM CHLORIDE, POTASSIUM CHLORIDE, SODIUM LACTATE AND CALCIUM CHLORIDE: 600; 310; 30; 20 INJECTION, SOLUTION INTRAVENOUS at 17:34

## 2021-07-25 RX ADMIN — ASPIRIN 81 MG: 81 TABLET, COATED ORAL at 21:30

## 2021-07-25 RX ADMIN — ONDANSETRON 4 MG: 2 INJECTION INTRAMUSCULAR; INTRAVENOUS at 12:23

## 2021-07-25 RX ADMIN — ONDANSETRON 4 MG: 2 INJECTION INTRAMUSCULAR; INTRAVENOUS at 18:24

## 2021-07-25 RX ADMIN — LORAZEPAM 0.5 MG: 2 INJECTION INTRAMUSCULAR; INTRAVENOUS at 12:26

## 2021-07-25 ASSESSMENT — LIFESTYLE VARIABLES
EVER FELT BAD OR GUILTY ABOUT YOUR DRINKING: NO
DOES PATIENT WANT TO STOP DRINKING: NO
DO YOU DRINK ALCOHOL: NO
TOTAL SCORE: 0
EVER HAD A DRINK FIRST THING IN THE MORNING TO STEADY YOUR NERVES TO GET RID OF A HANGOVER: NO
HAVE PEOPLE ANNOYED YOU BY CRITICIZING YOUR DRINKING: NO
HAVE YOU EVER FELT YOU SHOULD CUT DOWN ON YOUR DRINKING: NO
SUBSTANCE_ABUSE: 0
HOW MANY TIMES IN THE PAST YEAR HAVE YOU HAD 5 OR MORE DRINKS IN A DAY: 0
ON A TYPICAL DAY WHEN YOU DRINK ALCOHOL HOW MANY DRINKS DO YOU HAVE: 0
TOTAL SCORE: 0
CONSUMPTION TOTAL: NEGATIVE
AVERAGE NUMBER OF DAYS PER WEEK YOU HAVE A DRINK CONTAINING ALCOHOL: 0
TOTAL SCORE: 0

## 2021-07-25 ASSESSMENT — PAIN DESCRIPTION - PAIN TYPE
TYPE: SURGICAL PAIN
TYPE: ACUTE PAIN
TYPE: SURGICAL PAIN
TYPE: SURGICAL PAIN
TYPE: ACUTE PAIN;SURGICAL PAIN
TYPE: ACUTE PAIN
TYPE: ACUTE PAIN;SURGICAL PAIN

## 2021-07-25 ASSESSMENT — ENCOUNTER SYMPTOMS
NERVOUS/ANXIOUS: 0
DOUBLE VISION: 0
FEVER: 0
DIZZINESS: 0
PALPITATIONS: 0
CHILLS: 0
HEARTBURN: 0
HEADACHES: 0
VOMITING: 0
SHORTNESS OF BREATH: 0
ABDOMINAL PAIN: 0
FALLS: 1
BLURRED VISION: 0
COUGH: 0

## 2021-07-25 ASSESSMENT — PAIN DESCRIPTION - DESCRIPTORS: DESCRIPTORS: SHOOTING

## 2021-07-25 ASSESSMENT — PAIN SCALES - GENERAL: PAIN_LEVEL: 3

## 2021-07-25 NOTE — ASSESSMENT & PLAN NOTE
"X-ray in the ER reported: \"Intertrochanteric right femur fracture\"  Orthopedic surgery has been consulted, we appreciate further recommendations.  Status post surgical repair 7/25  Pain management.  PT recommended home health and front wheel walker which were ordered  "

## 2021-07-25 NOTE — ED TRIAGE NOTES
"Pamela Watkins 66 y.o. female bib EMS from home for     Chief Complaint   Patient presents with   • T-5000 GLF   • Hip Pain     Per EMS, pt was walking in her garage bringing groceries in when she had a mGLF onto her R hip.  Pt did not hit her head, denies LOC.  Pt non-ambulatory on scene.  PIV placed by EMS pta and pt was given total of 100mcg fentanyl and 1mg versed.  Pt has knees bent up and feet flat on gurney on arrival to ED.  C/o pain 4/10 with resting w/ stabbing pain w/ twisting motion.  CMS intact.  Pt has good ROM with extension and flexion but limited ROM w/ twisting.    /74   Pulse 80   Temp 36.3 °C (97.4 °F) (Temporal)   Resp 14   Ht 1.651 m (5' 5\")   Wt 49.9 kg (110 lb)   SpO2 92%   BMI 18.30 kg/m²   Up for ERP evaluation.  "

## 2021-07-25 NOTE — H&P
"Hospital Medicine History & Physical Note    Date of Service  7/25/2021    Primary Care Physician  Jayden Kellogg P.A.-C.    Consultants  orthopedics, consulted by ER Physician.    Code Status  DNAR/DNI    Chief Complaint  Chief Complaint   Patient presents with   • T-5000 GLF   • Hip Pain       History of Presenting Illness  Pamela Watkins is a 66 y.o. female with past medical history of hyperlipidemia who presented 7/25/2021 with ground-level fall.    The patient states that she was carrying groceries this morning when she tripped and fell and hit her hip.  She had significant amount of pain but she decided to come to the ER for further assessment and evaluation.  The patient denies chest pain, abdominal pain, headache, fever, sick contacts or any other focal neurological deficit.    In the ER the patient had an x-ray that reported: \"Intertrochanteric right femur fracture\".  Orthopedic surgery has been consulted by the ER physician, we are pending evaluation, we appreciate further recommendations.    The patient will be admitted for further evaluation and management.    I discussed the plan of care with patient, family and ER Physician..    Review of Systems  Review of Systems   Constitutional: Negative for chills and fever.   HENT: Negative for hearing loss and nosebleeds.    Eyes: Negative for blurred vision and double vision.   Respiratory: Negative for cough and shortness of breath.    Cardiovascular: Negative for chest pain and palpitations.   Gastrointestinal: Negative for abdominal pain, heartburn and vomiting.   Genitourinary: Negative for dysuria and urgency.   Musculoskeletal: Positive for falls and joint pain.   Skin: Negative for itching and rash.   Neurological: Negative for dizziness and headaches.   Psychiatric/Behavioral: Negative for substance abuse. The patient is not nervous/anxious.    All other systems reviewed and are negative.      Past Medical History   has a past medical history of " Hyperlipidemia.    Surgical History   has a past surgical history that includes tubal ligation.     Family History  family history is not on file.     Social History   reports that she has been smoking cigarettes. She has a 25.00 pack-year smoking history. She has never used smokeless tobacco. She reports that she does not drink alcohol and does not use drugs.    Allergies  No Known Allergies    Medications  Prior to Admission Medications   Prescriptions Last Dose Informant Patient Reported? Taking?   atorvastatin (LIPITOR) 20 MG Tab 7/24/2021 at pm Patient No No   Sig: Take 1 Tab by mouth every day.      Facility-Administered Medications: None       Physical Exam  Temp:  [36.3 °C (97.4 °F)] 36.3 °C (97.4 °F)  Pulse:  [64-82] 82  Resp:  [14-18] 18  BP: (118-156)/(63-74) 130/63  SpO2:  [92 %-98 %] 96 %    Physical Exam  Vitals and nursing note reviewed.   Constitutional:       General: She is in acute distress (due to pain.).      Appearance: Normal appearance. She is not ill-appearing.   HENT:      Head: Normocephalic and atraumatic.      Right Ear: External ear normal.      Left Ear: External ear normal.      Mouth/Throat:      Pharynx: No oropharyngeal exudate or posterior oropharyngeal erythema.   Eyes:      General:         Right eye: No discharge.         Left eye: No discharge.   Cardiovascular:      Rate and Rhythm: Normal rate and regular rhythm.      Pulses: Normal pulses.      Heart sounds: No murmur heard.   No gallop.    Pulmonary:      Effort: Pulmonary effort is normal. No respiratory distress.      Breath sounds: Normal breath sounds. No wheezing or rhonchi.   Abdominal:      General: Bowel sounds are normal. There is no distension.      Palpations: Abdomen is soft.      Tenderness: There is no abdominal tenderness. There is no guarding.   Musculoskeletal:         General: Swelling and tenderness present.      Cervical back: Normal range of motion and neck supple. No tenderness.   Skin:     General:  "Skin is warm and dry.   Neurological:      General: No focal deficit present.      Mental Status: She is alert and oriented to person, place, and time. Mental status is at baseline.      Motor: No weakness.   Psychiatric:         Mood and Affect: Mood normal.         Behavior: Behavior normal.         Thought Content: Thought content normal.         Judgment: Judgment normal.         Laboratory:  Recent Labs     07/25/21  1218   WBC 8.9   RBC 4.12*   HEMOGLOBIN 12.6   HEMATOCRIT 38.1   MCV 92.5   MCH 30.6   MCHC 33.1*   RDW 44.5   PLATELETCT 229   MPV 10.0     Recent Labs     07/25/21  1218   SODIUM 139   POTASSIUM 3.8   CHLORIDE 104   CO2 26   GLUCOSE 107*   BUN 18   CREATININE 0.78   CALCIUM 9.0     Recent Labs     07/25/21  1218   ALTSGPT 17   ASTSGOT 22   ALKPHOSPHAT 78   TBILIRUBIN 0.3   GLUCOSE 107*         No results for input(s): NTPROBNP in the last 72 hours.      No results for input(s): TROPONINT in the last 72 hours.    Imaging:  DX-FEMUR-2+ RIGHT   Final Result      Intertrochanteric right femur fracture      DX-PELVIS-1 OR 2 VIEWS   Final Result      1.  Intratrochanteric fracture of RIGHT proximal femur.   2.  No pelvic fracture.            Assessment/Plan:  I anticipate this patient will require at least two midnights for appropriate medical management, necessitating inpatient admission.    * Femur fracture (HCC)  Assessment & Plan  X-ray in the ER reported: \"Intertrochanteric right femur fracture\"  Orthopedic surgery has been consulted, we appreciate further recommendations.  N.p.o. for now.  Pain management.    Fall  Assessment & Plan  Patient had a ground-level fall today while carrying groceries.  The patient states that she tripped.  PT/OT for safe discharge planning.    Mixed hyperlipidemia- (present on admission)  Assessment & Plan  Resume home medication.  Lipid panel tomorrow morning.    Smoker- (present on admission)  Assessment & Plan  Counseled on lifestyle modifications.  The patient " denied requiring nicotine replacement at this time.        VTE prophylaxis: SCDs/TEDs

## 2021-07-25 NOTE — ED PROVIDER NOTES
ED Provider Note    Scribed for Bhupendra Polo M.D. by Giana Jacobo. 7/25/2021  11:47 AM    Primary care provider: Jayden Kellogg P.A.-C.  Means of arrival: EMS  History obtained from: Patient   History limited by: None    CHIEF COMPLAINT  Chief Complaint   Patient presents with    T-5000 GLF    Hip Pain       HPI  Pamela Watkins is a 66 y.o. female who presents to the Emergency Department via ambulance following a ground level fall that occurred just prior to arrival. The patient states that she was bringing groceries in through her garage when she tripped and fell onto her right hip. She reports associated right thigh pain and right hip pain. She describes her pain as soreness and says that it is exacerbated by straightening her leg. Patient denies head trauma, neck pain, back pain, or loss of consciousness. She says that she is not on blood thinners, however she takes aspirin daily.     REVIEW OF SYSTEMS  Pertinent positives include right hip pain. Pertinent negatives include no head trauma, neck pain, back pain, or loss of consciousness.  All other systems reviewed and negative.    PAST MEDICAL HISTORY   has a past medical history of Hyperlipidemia.    SURGICAL HISTORY   has a past surgical history that includes tubal ligation.    SOCIAL HISTORY  Social History     Tobacco Use    Smoking status: Current Every Day Smoker     Packs/day: 0.50     Years: 50.00     Pack years: 25.00     Types: Cigarettes    Smokeless tobacco: Never Used   Vaping Use    Vaping Use: Never used   Substance Use Topics    Alcohol use: No    Drug use: No      Social History     Substance and Sexual Activity   Drug Use No       FAMILY HISTORY  No family history noted.    CURRENT MEDICATIONS  Home Medications       Reviewed by David Espinosa M.D. (Physician) on 07/25/21 at 1725  Med List Status: Complete     Medication Last Dose Status   atorvastatin (LIPITOR) 20 MG Tab 7/24/2021 Active                    ALLERGIES  No Known  "Allergies    PHYSICAL EXAM  VITAL SIGNS: /74   Pulse 80   Temp 36.3 °C (97.4 °F) (Temporal)   Resp 14   Ht 1.651 m (5' 5\")   Wt 49.9 kg (110 lb)   SpO2 96%   BMI 18.30 kg/m²     Constitutional: Well developed, Well nourished, Non-toxic appearance.   HENT: Normocephalic, Atraumatic, Bilateral external ears normal, Oropharynx moist, No oral exudates.   Eyes: PERRLA, EOMI, Conjunctiva normal, No discharge.   Neck: No tenderness, Supple, No stridor.   Lymphatic: No lymphadenopathy noted.   Cardiovascular: Normal heart rate, Normal rhythm.   Thorax & Lungs: Clear to auscultation bilaterally, No respiratory distress, No wheezing, No crackles.   Abdomen: Soft, No tenderness, No masses, No pulsatile masses.   Skin: Warm, Dry, No erythema, No rash.   Extremities:, No edema No cyanosis. Tenderness and soreness to the right thigh.  Musculoskeletal: No major deformities noted.  Intact distal pulses. Slight tenderness of the right pelvis.   Neurologic: Awake, alert. Moves all extremities spontaneously.  Psychiatric: Affect normal, Judgment normal, Mood normal.     LABS  Results for orders placed or performed during the hospital encounter of 07/25/21   CBC w/ Differential   Result Value Ref Range    WBC 8.9 4.8 - 10.8 K/uL    RBC 4.12 (L) 4.20 - 5.40 M/uL    Hemoglobin 12.6 12.0 - 16.0 g/dL    Hematocrit 38.1 37.0 - 47.0 %    MCV 92.5 81.4 - 97.8 fL    MCH 30.6 27.0 - 33.0 pg    MCHC 33.1 (L) 33.6 - 35.0 g/dL    RDW 44.5 35.9 - 50.0 fL    Platelet Count 229 164 - 446 K/uL    MPV 10.0 9.0 - 12.9 fL    Neutrophils-Polys 74.90 (H) 44.00 - 72.00 %    Lymphocytes 15.50 (L) 22.00 - 41.00 %    Monocytes 7.60 0.00 - 13.40 %    Eosinophils 0.80 0.00 - 6.90 %    Basophils 0.50 0.00 - 1.80 %    Immature Granulocytes 0.70 0.00 - 0.90 %    Nucleated RBC 0.00 /100 WBC    Neutrophils (Absolute) 6.65 2.00 - 7.15 K/uL    Lymphs (Absolute) 1.37 1.00 - 4.80 K/uL    Monos (Absolute) 0.67 0.00 - 0.85 K/uL    Eos (Absolute) 0.07 0.00 - " 0.51 K/uL    Baso (Absolute) 0.04 0.00 - 0.12 K/uL    Immature Granulocytes (abs) 0.06 0.00 - 0.11 K/uL    NRBC (Absolute) 0.00 K/uL   Complete Metabolic Panel (CMP)   Result Value Ref Range    Sodium 139 135 - 145 mmol/L    Potassium 3.8 3.6 - 5.5 mmol/L    Chloride 104 96 - 112 mmol/L    Co2 26 20 - 33 mmol/L    Anion Gap 9.0 7.0 - 16.0    Glucose 107 (H) 65 - 99 mg/dL    Bun 18 8 - 22 mg/dL    Creatinine 0.78 0.50 - 1.40 mg/dL    Calcium 9.0 8.5 - 10.5 mg/dL    AST(SGOT) 22 12 - 45 U/L    ALT(SGPT) 17 2 - 50 U/L    Alkaline Phosphatase 78 30 - 99 U/L    Total Bilirubin 0.3 0.1 - 1.5 mg/dL    Albumin 4.0 3.2 - 4.9 g/dL    Total Protein 6.2 6.0 - 8.2 g/dL    Globulin 2.2 1.9 - 3.5 g/dL    A-G Ratio 1.8 g/dL   ESTIMATED GFR   Result Value Ref Range    GFR If African American >60 >60 mL/min/1.73 m 2    GFR If Non African American >60 >60 mL/min/1.73 m 2   SARS-COV Antigen DAIANA: Collect dry nasal swab   Result Value Ref Range    SARS-CoV-2 Source Nasal Swab     SARS-COV ANTIGEN DAIANA NotDetected Not-Detected   EKG   Result Value Ref Range    Report       [facility]    Test Date:  2021  Pt Name:    LISA MCCAULEY                Department:   MRN:        [id number]                      Room:       [facility]  Gender:     Female                       Technician: TXM  :        [id number]                   Requested By:NAUN AGRAWAL  Order #:    [id number]                    Reading MD:    Measurements  Intervals                                Axis  Rate:       81                           P:          77  TN:         144                          QRS:        -51  QRSD:       84                           T:          16  QT:         392  QTc:        455    Interpretive Statements  SINUS RHYTHM  LEFT AXIS DEVIATION  No previous ECG available for comparison          RADIOLOGY  DX-FEMUR-2+ RIGHT   Final Result      Intertrochanteric right femur fracture      DX-PELVIS-1 OR 2 VIEWS   Final Result      1.  Intratrochanteric  fracture of RIGHT proximal femur.   2.  No pelvic fracture.      DX-FEMUR-2+ RIGHT    (Results Pending)   DX-PORTABLE FLUORO > 1 HOUR    (Results Pending)     The radiologist's interpretation of all radiological studies have been reviewed by me.      COURSE & MEDICAL DECISION MAKING  Pertinent Labs & Imaging studies reviewed. (See chart for details)    11:47 AM - Patient seen and examined at bedside. Patient will be treated with Zofran 4 mg, Ativan 0.5 mg, and Dilaudid 0.5 mg. Ordered DX-Femur right, DX-Pelvis, CBC with diff, and CMP to evaluate her symptoms. The differential diagnoses include but are not limited to: pelvic fracture vs hip fracture vs femur fracture    2:08 PM - Updated patient on the above findings and plan for admission. Ordered SARS-COV Antigen.     2:16 PM - Paged Hospitalist.     2:22 PM - Paged Ortho.     2:30 PM - I discussed the patient's case and the above findings with Dr. Dunn (Hospitalist) who agrees to assess the patient for hospitalization.      3:26 PM - I discussed the patient's case and the above findings with Dr. Ham (Ortho) who will consult the patient.      Decision Making:  Status post fall, hip pain, the patient has a intertrochanteric hip fracture.  Discussed the case with orthopedics will take the patient operating room, discussed the case with hospitalist for hospitalization.    DISPOSITION:  Patient will be hospitalized by Dr. Dunn in guarded condition.     FINAL IMPRESSION  1. Closed fracture of right hip, initial encounter ([facility])          Giana BETANCOURT (Leanna), am scribing for, and in the presence of, Bhupendra Polo M.D..    Electronically signed by: Giana Jacobo (Leanna), 7/25/2021    Bhupendra BETANCOURT M.D. personally performed the services described in this documentation, as scribed by Giana Jacobo in my presence, and it is both accurate and complete. C    The note accurately reflects work and decisions made by me.  Bhupendra Polo M.D.   7/25/2021  5:35 PM

## 2021-07-25 NOTE — ED NOTES
Blood has been drawn and sent to the lab.  Pt medicated per MAR.  All monitoring in place.  Pt awaiting x-ray.  Updated on POC.

## 2021-07-25 NOTE — ASSESSMENT & PLAN NOTE
Patient had a ground-level fall today while carrying groceries.  The patient states that she tripped.  Physical therapy input noted

## 2021-07-25 NOTE — CONSULTS
Orthopedic Surgery History and Physical      I was paged at 2:22pm and saw the patient at 3:00pm     Pamela Watkins is a 66 y.o. female who presents after a A ground-level fall in her driveway with today.  She was carrying groceries and said she was carrying too many which caused her to lose her balance and fall directly onto the hip. She denies hitting her head or injuring any other extremity.  Her pain is localized to the right hip.  She denies any numbness or weakness in the right foot.     Past Medical History:   Diagnosis Date   • Hyperlipidemia        Past Surgical History:   Procedure Laterality Date   • TUBAL LIGATION         Medications  No current facility-administered medications on file prior to encounter.     Current Outpatient Medications on File Prior to Encounter   Medication Sig Dispense Refill   • atorvastatin (LIPITOR) 20 MG Tab Take 1 Tab by mouth every day. 90 Tab 3       Allergies  Patient has no known allergies.    ROS  All other systems were reviewed and found to be negative    No family history on file.    Social History     Socioeconomic History   • Marital status:      Spouse name: Not on file   • Number of children: Not on file   • Years of education: Not on file   • Highest education level: Not on file   Occupational History   • Occupation: Retired  for [employer]   Tobacco Use   • Smoking status: Current Every Day Smoker     Packs/day: 0.50     Years: 50.00     Pack years: 25.00     Types: Cigarettes   • Smokeless tobacco: Never Used   Vaping Use   • Vaping Use: Never used   Substance and Sexual Activity   • Alcohol use: No   • Drug use: No   • Sexual activity: Yes     Partners: Male   Other Topics Concern   • Not on file   Social History Narrative   • Not on file     Social Determinants of Health     Financial Resource Strain:    • Difficulty of Paying Living Expenses:    Food Insecurity:    • Worried About Running Out of Food in the Last Year:    • Ran Out of  "Food in the Last Year:    Transportation Needs:    • Lack of Transportation (Medical):    • Lack of Transportation (Non-Medical):    Physical Activity:    • Days of Exercise per Week:    • Minutes of Exercise per Session:    Stress:    • Feeling of Stress :    Social Connections:    • Frequency of Communication with Friends and Family:    • Frequency of Social Gatherings with Friends and Family:    • Attends Gnosticism Services:    • Active Member of Clubs or Organizations:    • Attends Club or Organization Meetings:    • Marital Status:    Intimate Partner Violence:    • Fear of Current or Ex-Partner:    • Emotionally Abused:    • Physically Abused:    • Sexually Abused:        Physical Exam  Vitals  /63   Pulse 82   Temp 36.3 °C (97.4 °F) (Temporal)   Resp 18   Ht 1.651 m (5' 5\")   Wt 49.9 kg (110 lb)   SpO2 96%   General: Well-appearing woman in no acute distress  Pulmonary: Nonlabored breathing on room air  Cardiac: Regular rate and rhythm to peripheral palpation  Focused examination of the right lower extremity:  No gross deformity, skin intact  Groin pain with gentle logroll that radiates towards the knee  No tenderness palpation of the knee or ankle.  Sensation is intact to light touch in the SPN, DPN, tibial nerve distributions.  5 out of 5 strength in EHL, tib ant, gastroc.  2+ DP pulse    Radiographs:  DX-FEMUR-2+ RIGHT   Final Result      Intertrochanteric right femur fracture      DX-PELVIS-1 OR 2 VIEWS   Final Result      1.  Intratrochanteric fracture of RIGHT proximal femur.   2.  No pelvic fracture.          Laboratory Values  Recent Labs     07/25/21  1218   WBC 8.9   RBC 4.12*   HEMOGLOBIN 12.6   HEMATOCRIT 38.1   MCV 92.5   MCH 30.6   MCHC 33.1*   RDW 44.5   PLATELETCT 229   MPV 10.0     Recent Labs     07/25/21  1218   SODIUM 139   POTASSIUM 3.8   CHLORIDE 104   CO2 26   GLUCOSE 107*   BUN 18             Impression:66 y.o. female with right intertrochanteric hip " fracture    Plan:Operative intervention. Risks and benefits of surgery were discussed which include but are not limited to bleeding, infection, neurovascular damage, malunion, nonunion, instability, limb length discrepancy, DVT, PE, MI, Stroke and death. They understand these risks and wish to proceed.    -Hospitalist admission  -Plan for OR this evening  -Keep n.p.o.  -Weight-bear as tolerated and PT/OT following surgery    Adriel Ham MD  [facility] Orthopedic Surgery Fellow

## 2021-07-25 NOTE — ED NOTES
Pt to pre-op via gurney with transport tech.  VSS at transfer.  All belongings in possession at transfer.

## 2021-07-25 NOTE — ASSESSMENT & PLAN NOTE
Counseled on lifestyle modifications.  The patient denied requiring nicotine replacement at this time.

## 2021-07-26 ENCOUNTER — APPOINTMENT (OUTPATIENT)
Dept: RADIOLOGY | Facility: MEDICAL CENTER | Age: 66
DRG: 482 | End: 2021-07-26
Attending: INTERNAL MEDICINE
Payer: MEDICARE

## 2021-07-26 PROBLEM — R09.02 HYPOXIA: Status: ACTIVE | Noted: 2021-07-26

## 2021-07-26 LAB
ALBUMIN SERPL BCP-MCNC: 3.4 G/DL (ref 3.2–4.9)
ALBUMIN/GLOB SERPL: 1.6 G/DL
ALP SERPL-CCNC: 58 U/L (ref 30–99)
ALT SERPL-CCNC: 14 U/L (ref 2–50)
ANION GAP SERPL CALC-SCNC: 6 MMOL/L (ref 7–16)
AST SERPL-CCNC: 19 U/L (ref 12–45)
BASOPHILS # BLD AUTO: 0.1 % (ref 0–1.8)
BASOPHILS # BLD: 0.01 K/UL (ref 0–0.12)
BILIRUB SERPL-MCNC: 0.4 MG/DL (ref 0.1–1.5)
BUN SERPL-MCNC: 15 MG/DL (ref 8–22)
CALCIUM SERPL-MCNC: 8.4 MG/DL (ref 8.5–10.5)
CHLORIDE SERPL-SCNC: 104 MMOL/L (ref 96–112)
CHOLEST SERPL-MCNC: 147 MG/DL (ref 100–199)
CO2 SERPL-SCNC: 26 MMOL/L (ref 20–33)
CREAT SERPL-MCNC: 0.72 MG/DL (ref 0.5–1.4)
D DIMER PPP IA.FEU-MCNC: 3.38 UG/ML (FEU) (ref 0–0.5)
EKG IMPRESSION: NORMAL
EOSINOPHIL # BLD AUTO: 0.01 K/UL (ref 0–0.51)
EOSINOPHIL NFR BLD: 0.1 % (ref 0–6.9)
ERYTHROCYTE [DISTWIDTH] IN BLOOD BY AUTOMATED COUNT: 44.9 FL (ref 35.9–50)
GLOBULIN SER CALC-MCNC: 2.1 G/DL (ref 1.9–3.5)
GLUCOSE SERPL-MCNC: 139 MG/DL (ref 65–99)
HCT VFR BLD AUTO: 30.7 % (ref 37–47)
HDLC SERPL-MCNC: 62 MG/DL
HGB BLD-MCNC: 10.1 G/DL (ref 12–16)
IMM GRANULOCYTES # BLD AUTO: 0.04 K/UL (ref 0–0.11)
IMM GRANULOCYTES NFR BLD AUTO: 0.4 % (ref 0–0.9)
LDLC SERPL CALC-MCNC: 71 MG/DL
LYMPHOCYTES # BLD AUTO: 1.13 K/UL (ref 1–4.8)
LYMPHOCYTES NFR BLD: 12.4 % (ref 22–41)
MAGNESIUM SERPL-MCNC: 1.9 MG/DL (ref 1.5–2.5)
MCH RBC QN AUTO: 30.3 PG (ref 27–33)
MCHC RBC AUTO-ENTMCNC: 32.9 G/DL (ref 33.6–35)
MCV RBC AUTO: 92.2 FL (ref 81.4–97.8)
MONOCYTES # BLD AUTO: 1.14 K/UL (ref 0–0.85)
MONOCYTES NFR BLD AUTO: 12.5 % (ref 0–13.4)
NEUTROPHILS # BLD AUTO: 6.76 K/UL (ref 2–7.15)
NEUTROPHILS NFR BLD: 74.5 % (ref 44–72)
NRBC # BLD AUTO: 0 K/UL
NRBC BLD-RTO: 0 /100 WBC
PLATELET # BLD AUTO: 171 K/UL (ref 164–446)
PMV BLD AUTO: 10 FL (ref 9–12.9)
POTASSIUM SERPL-SCNC: 4 MMOL/L (ref 3.6–5.5)
PROT SERPL-MCNC: 5.5 G/DL (ref 6–8.2)
RBC # BLD AUTO: 3.33 M/UL (ref 4.2–5.4)
SODIUM SERPL-SCNC: 136 MMOL/L (ref 135–145)
TRIGL SERPL-MCNC: 72 MG/DL (ref 0–149)
WBC # BLD AUTO: 9.1 K/UL (ref 4.8–10.8)

## 2021-07-26 PROCEDURE — 85379 FIBRIN DEGRADATION QUANT: CPT

## 2021-07-26 PROCEDURE — 36415 COLL VENOUS BLD VENIPUNCTURE: CPT

## 2021-07-26 PROCEDURE — 85025 COMPLETE CBC W/AUTO DIFF WBC: CPT

## 2021-07-26 PROCEDURE — 700102 HCHG RX REV CODE 250 W/ 637 OVERRIDE(OP): Performed by: INTERNAL MEDICINE

## 2021-07-26 PROCEDURE — 99024 POSTOP FOLLOW-UP VISIT: CPT | Performed by: ORTHOPAEDIC SURGERY

## 2021-07-26 PROCEDURE — 700117 HCHG RX CONTRAST REV CODE 255: Performed by: INTERNAL MEDICINE

## 2021-07-26 PROCEDURE — 770006 HCHG ROOM/CARE - MED/SURG/GYN SEMI*

## 2021-07-26 PROCEDURE — 700111 HCHG RX REV CODE 636 W/ 250 OVERRIDE (IP): Performed by: INTERNAL MEDICINE

## 2021-07-26 PROCEDURE — A9270 NON-COVERED ITEM OR SERVICE: HCPCS | Performed by: INTERNAL MEDICINE

## 2021-07-26 PROCEDURE — 71275 CT ANGIOGRAPHY CHEST: CPT | Mod: ME

## 2021-07-26 PROCEDURE — 80061 LIPID PANEL: CPT

## 2021-07-26 PROCEDURE — 83735 ASSAY OF MAGNESIUM: CPT

## 2021-07-26 PROCEDURE — 71045 X-RAY EXAM CHEST 1 VIEW: CPT

## 2021-07-26 PROCEDURE — A9270 NON-COVERED ITEM OR SERVICE: HCPCS | Performed by: ORTHOPAEDIC SURGERY

## 2021-07-26 PROCEDURE — 97166 OT EVAL MOD COMPLEX 45 MIN: CPT

## 2021-07-26 PROCEDURE — 97162 PT EVAL MOD COMPLEX 30 MIN: CPT

## 2021-07-26 PROCEDURE — 99232 SBSQ HOSP IP/OBS MODERATE 35: CPT | Performed by: INTERNAL MEDICINE

## 2021-07-26 PROCEDURE — 80053 COMPREHEN METABOLIC PANEL: CPT

## 2021-07-26 PROCEDURE — 700111 HCHG RX REV CODE 636 W/ 250 OVERRIDE (IP): Performed by: ORTHOPAEDIC SURGERY

## 2021-07-26 PROCEDURE — 700102 HCHG RX REV CODE 250 W/ 637 OVERRIDE(OP): Performed by: ORTHOPAEDIC SURGERY

## 2021-07-26 PROCEDURE — 93010 ELECTROCARDIOGRAM REPORT: CPT | Performed by: INTERNAL MEDICINE

## 2021-07-26 RX ORDER — ACETAMINOPHEN 325 MG/1
650 TABLET ORAL EVERY 4 HOURS PRN
Status: DISCONTINUED | OUTPATIENT
Start: 2021-07-26 | End: 2021-07-28 | Stop reason: HOSPADM

## 2021-07-26 RX ADMIN — ASPIRIN 81 MG: 81 TABLET, COATED ORAL at 08:46

## 2021-07-26 RX ADMIN — ENOXAPARIN SODIUM 40 MG: 40 INJECTION SUBCUTANEOUS at 13:05

## 2021-07-26 RX ADMIN — ASPIRIN 81 MG: 81 TABLET, COATED ORAL at 21:48

## 2021-07-26 RX ADMIN — ACETAMINOPHEN 650 MG: 325 TABLET, FILM COATED ORAL at 13:04

## 2021-07-26 RX ADMIN — OXYCODONE 2.5 MG: 5 TABLET ORAL at 13:04

## 2021-07-26 RX ADMIN — IOHEXOL 60 ML: 350 INJECTION, SOLUTION INTRAVENOUS at 17:18

## 2021-07-26 RX ADMIN — DOCUSATE SODIUM 50 MG AND SENNOSIDES 8.6 MG 2 TABLET: 8.6; 5 TABLET, FILM COATED ORAL at 16:33

## 2021-07-26 RX ADMIN — CEFAZOLIN SODIUM 2 G: 2 INJECTION, SOLUTION INTRAVENOUS at 05:18

## 2021-07-26 RX ADMIN — OXYCODONE 2.5 MG: 5 TABLET ORAL at 07:28

## 2021-07-26 RX ADMIN — ATORVASTATIN CALCIUM 20 MG: 20 TABLET, FILM COATED ORAL at 16:33

## 2021-07-26 RX ADMIN — OXYCODONE 5 MG: 5 TABLET ORAL at 21:48

## 2021-07-26 ASSESSMENT — COGNITIVE AND FUNCTIONAL STATUS - GENERAL
WALKING IN HOSPITAL ROOM: A LITTLE
SUGGESTED CMS G CODE MODIFIER DAILY ACTIVITY: CK
CLIMB 3 TO 5 STEPS WITH RAILING: A LOT
PERSONAL GROOMING: A LITTLE
MOVING FROM LYING ON BACK TO SITTING ON SIDE OF FLAT BED: A LITTLE
STANDING UP FROM CHAIR USING ARMS: A LITTLE
DRESSING REGULAR LOWER BODY CLOTHING: A LOT
TOILETING: A LITTLE
DAILY ACTIVITIY SCORE: 17
SUGGESTED CMS G CODE MODIFIER MOBILITY: CK
MOBILITY SCORE: 19
DRESSING REGULAR UPPER BODY CLOTHING: A LITTLE
HELP NEEDED FOR BATHING: A LOT

## 2021-07-26 ASSESSMENT — COPD QUESTIONNAIRES
DO YOU EVER COUGH UP ANY MUCUS OR PHLEGM?: NO/ONLY WITH OCCASIONAL COLDS OR INFECTIONS
COPD SCREENING SCORE: 5
HAVE YOU SMOKED AT LEAST 100 CIGARETTES IN YOUR ENTIRE LIFE: YES
DURING THE PAST 4 WEEKS HOW MUCH DID YOU FEEL SHORT OF BREATH: NONE/LITTLE OF THE TIME

## 2021-07-26 ASSESSMENT — ENCOUNTER SYMPTOMS
NERVOUS/ANXIOUS: 0
FOCAL WEAKNESS: 0
CHILLS: 0
DOUBLE VISION: 0
HEADACHES: 0
FEVER: 0
PALPITATIONS: 0
ORTHOPNEA: 0
PHOTOPHOBIA: 0
NECK PAIN: 0
TREMORS: 0
VOMITING: 0
SPUTUM PRODUCTION: 0
NAUSEA: 0
POLYDIPSIA: 0
SPEECH CHANGE: 0
FLANK PAIN: 0
COUGH: 0
BLURRED VISION: 0
HALLUCINATIONS: 0
HEARTBURN: 0
BRUISES/BLEEDS EASILY: 0
BACK PAIN: 0
HEMOPTYSIS: 0
WEIGHT LOSS: 0

## 2021-07-26 ASSESSMENT — GAIT ASSESSMENTS
GAIT LEVEL OF ASSIST: SUPERVISED
DEVIATION: STEP TO;ANTALGIC
DISTANCE (FEET): 75
ASSISTIVE DEVICE: FRONT WHEEL WALKER

## 2021-07-26 ASSESSMENT — PAIN DESCRIPTION - PAIN TYPE
TYPE: ACUTE PAIN;SURGICAL PAIN

## 2021-07-26 ASSESSMENT — ACTIVITIES OF DAILY LIVING (ADL): TOILETING: INDEPENDENT

## 2021-07-26 ASSESSMENT — FIBROSIS 4 INDEX: FIB4 SCORE: 1.959915774024445487

## 2021-07-26 ASSESSMENT — LIFESTYLE VARIABLES: SUBSTANCE_ABUSE: 0

## 2021-07-26 NOTE — OR NURSING
1842: Pt arrived from OR, handoff received from anesthesiologist and RN. Dressing to R hip x 2 with gauze and tegaderm C/D/I. 2+ R pedal pulse, cap refill < 3sec.     1907: Call made to patient's  to update patient status.     1913: Patient medicated with oral pain meds per anesthesia orders.     1915: Cunha removed per MD orders.     1934: Handoff to REBECA Muller on T3. Transport requested. O2 tank level above half full, pt transported on 2 L 02. Tubing connected to device and to patient and stored safely in hein prior to transport.

## 2021-07-26 NOTE — PROGRESS NOTES
Assumed care of patient. Plan of care discussed, questions answered. Assessment completed. VSS, A&O x4, denies pain. PRN med ordered. Call light in reach. Patient educated on use of call light for assistance.    WBAT on RLE

## 2021-07-26 NOTE — PROGRESS NOTES
Hospital Medicine Daily Progress Note    Date of Service  7/26/2021    Chief Complaint/Hospital Course  Pamela Watkins is a 66 y.o. female with past medical history of dyslipidemia, admitted 7/25/2021 with ground-level fall and right intertrochanteric right femur fracture.  Status post intramedullary nailing on 7/25 by Dr. Todd          Interval Problem Update  Patient states that pain in the right hip significantly improved after the surgery.  She noted to be on 2 L, desaturated to 86% on room air.  Denies cough or shortness of breath.  Chest x-ray negative for acute cardiopulmonary disease.  Ordered D-dimer, if elevated, will evaluate for PE with CT PE study.  PT recommended frontwheel walker, home health.  Placed referral for home health.      I have personally seen and examined the patient at bedside. I discussed the plan of care with patient.    Consultants/Specialty  orthopedics    Code Status  DNAR/DNI    Disposition  Patient is not medically cleared.   Anticipate discharge to To be discussed.  I have placed the appropriate orders for post-discharge needs.    Review of Systems  Review of Systems   Constitutional: Negative for chills, fever and weight loss.   HENT: Negative for ear pain, hearing loss and tinnitus.    Eyes: Negative for blurred vision, double vision and photophobia.   Respiratory: Negative for cough, hemoptysis and sputum production.    Cardiovascular: Negative for chest pain, palpitations and orthopnea.   Gastrointestinal: Negative for heartburn, nausea and vomiting.   Genitourinary: Negative for dysuria, flank pain, frequency and hematuria.   Musculoskeletal: Positive for joint pain. Negative for back pain and neck pain.   Skin: Negative for itching and rash.   Neurological: Negative for tremors, speech change, focal weakness and headaches.   Endo/Heme/Allergies: Negative for environmental allergies and polydipsia. Does not bruise/bleed easily.   Psychiatric/Behavioral: Negative for  hallucinations and substance abuse. The patient is not nervous/anxious.         Physical Exam  Temp:  [35.9 °C (96.7 °F)-36.7 °C (98 °F)] 36.7 °C (98 °F)  Pulse:  [] 90  Resp:  [12-22] 16  BP: ()/(53-84) 88/56  SpO2:  [86 %-99 %] 86 %    Physical Exam  Vitals and nursing note reviewed.   Constitutional:       General: She is not in acute distress.     Appearance: Normal appearance.   HENT:      Head: Normocephalic and atraumatic.      Nose: Nose normal.      Mouth/Throat:      Mouth: Mucous membranes are moist.   Eyes:      Extraocular Movements: Extraocular movements intact.      Pupils: Pupils are equal, round, and reactive to light.   Cardiovascular:      Rate and Rhythm: Normal rate and regular rhythm.   Pulmonary:      Effort: Pulmonary effort is normal.      Breath sounds: Normal breath sounds.   Abdominal:      General: Abdomen is flat. There is no distension.      Tenderness: There is no abdominal tenderness.   Musculoskeletal:         General: No swelling or deformity.      Cervical back: Normal range of motion and neck supple.      Right hip: Tenderness present. Decreased range of motion.   Skin:     General: Skin is warm and dry.   Neurological:      General: No focal deficit present.      Mental Status: She is alert and oriented to person, place, and time.   Psychiatric:         Mood and Affect: Mood normal.         Behavior: Behavior normal.         Fluids    Intake/Output Summary (Last 24 hours) at 7/26/2021 1214  Last data filed at 7/26/2021 0848  Gross per 24 hour   Intake 2465 ml   Output 450 ml   Net 2015 ml       Laboratory  Recent Labs     07/25/21  1218 07/26/21  0649   WBC 8.9 9.1   RBC 4.12* 3.33*   HEMOGLOBIN 12.6 10.1*   HEMATOCRIT 38.1 30.7*   MCV 92.5 92.2   MCH 30.6 30.3   MCHC 33.1* 32.9*   RDW 44.5 44.9   PLATELETCT 229 171   MPV 10.0 10.0     Recent Labs     07/25/21  1218 07/26/21  0649   SODIUM 139 136   POTASSIUM 3.8 4.0   CHLORIDE 104 104   CO2 26 26   GLUCOSE 107* 139*  "  BUN 18 15   CREATININE 0.78 0.72   CALCIUM 9.0 8.4*             Recent Labs     07/26/21  0649   TRIGLYCERIDE 72   HDL 62   LDL 71       Imaging  DX-CHEST-LIMITED (1 VIEW)   Final Result      No acute cardiopulmonary disease.      DX-FEMUR-2+ RIGHT   Final Result      Intraoperative fluoroscopic spot images as described above.      DX-PORTABLE FLUORO > 1 HOUR   Final Result      Intraoperative fluoroscopic spot images as described above.      DX-FEMUR-2+ RIGHT   Final Result      Intertrochanteric right femur fracture      DX-PELVIS-1 OR 2 VIEWS   Final Result      1.  Intratrochanteric fracture of RIGHT proximal femur.   2.  No pelvic fracture.           Assessment/Plan  * Femur fracture (HCC)  Assessment & Plan  X-ray in the ER reported: \"Intertrochanteric right femur fracture\"  Orthopedic surgery has been consulted, we appreciate further recommendations.  Status post surgical repair 7/25  Pain management.  PT recommended home health and front wheel walker which was ordered    Hypoxia  Assessment & Plan  Unclear etiology  Chest x-ray negative for acute process  D-dimer pending  EKG reviewed, nonconcerning for MI  Continue incentive spirometry, supplemental oxygen, wean off as tolerated  Judicious use of opiates    Fall  Assessment & Plan  Patient had a ground-level fall today while carrying groceries.  The patient states that she tripped.  Physical therapy input noted    Mixed hyperlipidemia- (present on admission)  Assessment & Plan  Resume home medication.  Lipid panel tomorrow morning.    Smoker- (present on admission)  Assessment & Plan  Counseled on lifestyle modifications.  The patient denied requiring nicotine replacement at this time.       VTE prophylaxis: enoxaparin ppx    I have performed a physical exam and reviewed and updated ROS and Plan today (7/26/2021). In review of yesterday's note (7/25/2021), there are no changes except as documented above.      "

## 2021-07-26 NOTE — PROGRESS NOTES
4 Eyes Skin Assessment Completed by REBECA Muller and REBECA Almaraz.    Head WDL  Ears WDL  Nose WDL  Mouth WDL  Neck WDL  Breast/Chest WDL  Shoulder Blades WDL  Spine WDL  (R) Arm/Elbow/Hand WDL  (L) Arm/Elbow/Hand WDL  Abdomen WDL  Groin WDL  Scrotum/Coccyx/Buttocks WDL  (R) Leg Incision, dry gauze and transparent film dressing  (L) Leg WDL  (R) Heel/Foot/Toe WDL  (L) Heel/Foot/Toe WDL          Devices In Places SCD's and Nasal Cannula      Interventions In Place Pillows    Possible Skin Injury No    Pictures Uploaded Into Epic N/A  Wound Consult Placed N/A  RN Wound Prevention Protocol Ordered No

## 2021-07-26 NOTE — ANESTHESIA POSTPROCEDURE EVALUATION
Patient: Pamela Watkins    Procedure Summary     Date: 07/25/21 Room / Location: [place] [address] / SURGERY [facility]    Anesthesia Start: 1734 Anesthesia Stop: 1844    Procedure: INSERTION, INTRAMEDULLARY CARINA, FEMUR, PROXIMAL (Right Hip) Diagnosis:       Closed right hip fracture (HCC)      (right hip fracture )    Surgeons: Kyle Todd M.D. Responsible Provider: David Espinosa M.D.    Anesthesia Type: general ASA Status: 2          Final Anesthesia Type: general  Last vitals  BP   Blood Pressure : 122/65    Temp   36.4 °C (97.5 °F)    Pulse   (!) 101   Resp   19    SpO2   98 %      Anesthesia Post Evaluation    Patient location during evaluation: PACU  Patient participation: complete - patient participated  Level of consciousness: awake and alert  Pain score: 3    Airway patency: patent  Anesthetic complications: no  Cardiovascular status: hemodynamically stable  Respiratory status: acceptable  Hydration status: euvolemic    PONV: none          No complications documented.     Nurse Pain Score: 0 (NPRS)

## 2021-07-26 NOTE — PROGRESS NOTES
Case was discussed with Dr. Ham.  She has been admitted with the displaced right intertrochanteric hip fracture which exits a bit above the lesser trochanter on the medial side.  We discussed the options and she was indicated for treatment with cephalomedullary nail.  Discussed the risk of infection, nonunion, malunion, hardware prominence, potential need for reoperation or revision or conversion to hip replacement in some circumstances, as well as risk of blood clots or other medical problems.  The patient has elected for bloodless surgery.  She states she does not have any Anabaptism opposition to blood products, but does states she does not want a blood transfusion, and would rather die than receive a blood transfusion.  All questions answered.

## 2021-07-26 NOTE — ASSESSMENT & PLAN NOTE
Unclear etiology  Chest x-ray negative for acute process  D-dimer elevated CTPE neg for PE but showing emphysema   EKG reviewed, nonconcerning for MI  Continue incentive spirometry, supplemental oxygen, wean off as tolerated  Judicious use of opiates

## 2021-07-26 NOTE — ANESTHESIA TIME REPORT
Anesthesia Start and Stop Event Times     Date Time Event    7/25/2021 1726 Ready for Procedure     1734 Anesthesia Start     1844 Anesthesia Stop        Responsible Staff  07/25/21    Name Role Begin End    David Espinosa M.D. Anesth 1734 1844        Preop Diagnosis (Free Text):  Pre-op Diagnosis     right hip fracture         Preop Diagnosis (Codes):  Diagnosis Information     Diagnosis Code(s): Closed right hip fracture (HCC) [S72.001A]        Post op Diagnosis  Hip fracture requiring operative repair, right, closed, initial encounter (HCC)      Premium Reason  E. Weekend    Comments:

## 2021-07-26 NOTE — CARE PLAN
Problem: Knowledge Deficit - Standard  Goal: Patient and family/care givers will demonstrate understanding of plan of care, disease process/condition, diagnostic tests and medications  Outcome: Progressing   Problem: Pain - Standard  Goal: Alleviation of pain or a reduction in pain to the patient’s comfort goal  Outcome: Progressing  Note: Educated on pain scale, encouraged to verbalize pain level. See MAR.    The patient is Stable - Low risk of patient condition declining or worsening         Progress made toward(s) clinical / shift goals:  pain control    Patient is not progressing towards the following goals:

## 2021-07-26 NOTE — THERAPY
Occupational Therapy   Initial Evaluation     Patient Name: Pamela Watkins  Age:  66 y.o., Sex:  female  Medical Record #: [id number]  Today's Date: 7/26/2021     Precautions  Precautions: Fall Risk, Weight Bearing As Tolerated Right Lower Extremity  Comments: s/p R hip nail    Assessment  Patient is 66 y.o. female s/p R femur IM nail after GLF. Reported nausea, lightheadedness, and diaphoresis toward end of session; BP 88/59. Returned to supine, edu on safety/risk of LOC and HoTN. BP returned to 92/62. Desaturation with mobility; educated on pursed lip breathing and pacing. Reports  can assist PRN 24/7. Currently limited by decreased functional mobility, activity tolerance, cognition, balance, adherence to precautions, and pain which are currently affecting pt's ability to complete ADLs/IADLs at baseline. Will continue to follow.     Plan    Recommend Occupational Therapy 4 times per week until therapy goals are met for the following treatments:  Adaptive Equipment, Manual Therapy Techniques, Self Care/Activities of Daily Living, Therapeutic Activities and Therapeutic Exercises.    DC Equipment Recommendations: Tub / Shower Seat, Grab Bar(s) in Tub / Shower  Discharge Recommendations: Recommend home health for continued occupational therapy services (as long as  can assist PRN)     Objective     07/26/21 1048   Prior Living Situation   Prior Services Home-Independent   Housing / Facility 1 Story House   Steps Into Home 1   Bathroom Set up Walk In Shower;Shower Chair  (will be putting seat in)   Equipment Owned Tub / Shower Seat   Lives with - Patient's Self Care Capacity Spouse   Comments Reports spouse will be taking time off to assist   Prior Level of ADL Function   Self Feeding Independent   Grooming / Hygiene Independent   Bathing Independent   Dressing Independent   Toileting Independent   Prior Level of IADL Function   Medication Management Independent   Laundry Independent   Kitchen Mobility  Independent   Finances Independent   Home Management Independent   Shopping Independent   Prior Level Of Mobility Independent Without Device in Community;Independent Without Device in Home   Driving / Transportation Driving Independent   History of Falls   History of Falls Yes   Date of Last Fall   (reason for admit)   Precautions   Precautions Fall Risk;Weight Bearing As Tolerated Right Lower Extremity   Comments s/p R hip nail   Vitals   Blood Pressure  (!) 88/59   Vitals Comments patient diaphorhetic, nauseous and dizzy at end of session; edu on safety/risk of LOC and low BP   Pain 0 - 10 Group   Location Hip   Location Orientation Right   Therapist Pain Assessment Post Activity;During Activity;Nurse Notified  (not quantified)   Cognition    Cognition / Consciousness X   Level of Consciousness Alert   Attention Impaired   Comments pleasant and cooperative; req v/cs for attention. reports feels foggy from medication   Passive ROM Upper Body   Passive ROM Upper Body WDL   Active ROM Upper Body   Active ROM Upper Body  WDL   Strength Upper Body   Upper Body Strength  WDL   Balance Assessment   Sitting Balance (Static) Fair +   Sitting Balance (Dynamic) Fair   Standing Balance (Static) Fair   Standing Balance (Dynamic) Fair -   Weight Shift Sitting Good   Weight Shift Standing Fair   Comments w/ FWW   Bed Mobility    Sit to Supine Supervised   Scooting Supervised   Comments HOB elevated; found in chair   ADL Assessment   Grooming Supervision;Standing  (washing hands; slight LOB)   Lower Body Dressing Moderate Assist  (socks)   Toileting   (declined need)   Comments limited by BP and pain   How much help from another person does the patient currently need...   Putting on and taking off regular lower body clothing? 2   Bathing (including washing, rinsing, and drying)? 2   Toileting, which includes using a toilet, bedpan, or urinal? 3   Putting on and taking off regular upper body clothing? 3   Taking care of personal  grooming such as brushing teeth? 3   Eating meals? 4   6 Clicks Daily Activity Score 17   Functional Mobility   Sit to Stand Minimal Assist   Bed, Chair, Wheelchair Transfer Supervised   Toilet Transfers Minimal Assist   Transfer Method Stand Step   Mobility w/ FWW in room and hallway   Edema / Skin Assessment   Edema / Skin  Not Assessed   Activity Tolerance   Comments limited by lightheadedness, diaphoresis and nausea; HoTN. Resolved in supine   Patient / Family Goals   Patient / Family Goal #1 to go home   Short Term Goals   Short Term Goal # 1 LB dressing with min A   Short Term Goal # 2 toileting/toilet txf with SPV   Short Term Goal # 3 standing g/h for >1 min w/o HoTN   Education Group   Education Provided Role of Occupational Therapist;Weight Bearing Precautions;Pathology of bedrest;Activities of Daily Living;Transfers   Role of Occupational Therapist Patient Response Patient;Family;Acceptance;* * Method * *;Verbal Demonstration   Transfers Patient Response Patient;Acceptance;Explanation;Action Demonstration;Reinforcement Needed   ADL Patient Response Patient;Acceptance;Explanation;Demonstration;Action Demonstration;Reinforcement Needed   Weight Bearing Precautions Patient Response Patient;Acceptance;Explanation;Action Demonstration   Pathology of Bedrest Patient Response Patient;Acceptance;Explanation;Verbal Demonstration;Reinforcement Needed

## 2021-07-26 NOTE — PROGRESS NOTES
"Orthopaedic Progress Note  POD1 s/p right hip cephalomedullary nail for IT fracture    S: No acute events. Awake and doing very well this morning. Pain controlled. Walked to bathroom. No numbness/weakness. Tolerating diet.     Recent Labs     07/25/21  1218   WBC 8.9   RBC 4.12*   HEMOGLOBIN 12.6   HEMATOCRIT 38.1   MCV 92.5   MCH 30.6   MCHC 33.1*   RDW 44.5   PLATELETCT 229   MPV 10.0       Examination:   /54   Pulse 78   Temp 36.2 °C (97.1 °F) (Temporal)   Resp 16   Ht 1.651 m (5' 5\")   Wt 49.9 kg (110 lb)   SpO2 95%     No acute distress  Breathing unlabored  Dressing clean dry and intact   Motor: fires ehl, tibialis anterior, gastrocnemius 5/5 strength  Sensation intact to light touch sural, saphenous, superficial peroneal, deep peroneal, tibial distributions  Toes warm and well perfused, cap refill <2      A/P: POD#1 s/p right hip nail for IT fracture after GLF. Doing well post-op    DVT Prophylaxis- TEDS/SCDs, aspirin 81mg BID  Weight Bearing Status-as toelrated  PT/OT  Antibiotics: to finish after 2 doses  Case Coordination    OK to d/c home today if clears PT, follow up with Dr Todd at [facility] in 2 weeks    Adriel Ham MD  [facility] Orthopedic Surgery Fellow            "

## 2021-07-26 NOTE — THERAPY
"Physical Therapy   Initial Evaluation     Patient Name: Pamela Watkins  Age:  66 y.o., Sex:  female  Medical Record #: [id number]  Today's Date: 7/26/2021     Precautions: Fall Risk, Weight Bearing As Tolerated Right Lower Extremity    Assessment  67 yo F s/p GLF is now POD1 s/p right hip cephalomedullary nail for IT fracture. She presents with pain, impaired balance, strength and activity tolerance. She will have support form  as needed at home. However, she was near syncope at end of mobility training today 2/2 low BP.  Patient needed ModA for 1 step with husbands assistance. If patient remains in house, will continue to follow for further therapy. If she DC home she will need  FWW and HHPT.     Plan    Recommend Physical Therapy 4 times per week until therapy goals are met for the following treatments:  Bed Mobility, Equipment, Gait Training, Neuro Re-Education / Balance, Stair Training, Therapeutic Activities and Therapeutic Exercises    DC Equipment Recommendations: Front-Wheel Walker  Discharge Recommendations: Recommend home health for continued physical therapy services       Subjective    \"I'm doing better I think\"     Objective       07/26/21 0900   Precautions   Precautions Fall Risk;Weight Bearing As Tolerated Right Lower Extremity   Comments s/p R hip CMN   Vitals   Pulse 90   Blood Pressure  (!) 88/56  (after ambulation )   Pulse Oximetry (!) 86 %   O2 Delivery Device None - Room Air   Vitals Comments patient diaphorhetic, nauseous and dizzy at end of gait training   Pain 0 - 10 Group   Location Hip   Location Orientation Right   Pain Rating Scale (NPRS) 5   Therapist Pain Assessment Post Activity Pain Same as Prior to Activity;5   Prior Living Situation   Prior Services Home-Independent   Housing / Facility 1 Story House   Steps Into Home 1   Steps In Home 0   Rail None   Bathroom Set up Walk In Shower   Equipment Owned None   Lives with - Patient's Self Care Capacity Spouse   Comments spouse can " assist as needed    Prior Level of Functional Mobility   Bed Mobility Independent   Transfer Status Independent   Ambulation Independent   Distance Ambulation (Feet)   (community distances )   Assistive Devices Used None   Stairs Independent   History of Falls   History of Falls Yes   Date of Last Fall   (admitted for a fall )   Cognition    Cognition / Consciousness WDL   Level of Consciousness Alert   Comments pleasant and cooperative, but easily distracted    Passive ROM Lower Body   Passive ROM Lower Body WDL   Active ROM Lower Body    Active ROM Lower Body  X   Comments R hip limited by pain    Strength Lower Body   Lower Body Strength  X   Comments R LE limited by pain, grossly 3/5    Sensation Lower Body   Lower Extremity Sensation   WDL   Strength Upper Body   Upper Body Strength  WDL   Vision   Vision Comments glasses for reading    Balance Assessment   Sitting Balance (Static) Fair +   Sitting Balance (Dynamic) Fair   Standing Balance (Static) Fair   Standing Balance (Dynamic) Fair -   Weight Shift Sitting Fair   Weight Shift Standing Fair   Comments w/FWW   Gait Analysis   Gait Level Of Assist Supervised   Assistive Device Front Wheel Walker   Distance (Feet) 75   # of Times Distance was Traveled 1   Deviation Step To;Antalgic  (repeated cueing for gait sequencing )   # of Stairs Climbed 1   Level of Assist with Stairs Moderate Assist   Weight Bearing Status WBAT R LE   Comments CG education for assist on 1 step no rails   Bed Mobility    Sit to Supine Supervised   Scooting Supervised   Functional Mobility   Sit to Stand Minimal Assist   Bed, Chair, Wheelchair Transfer Minimal Assist   Toilet Transfers Minimal Assist   Comments cueing for safety, set-up and technique    How much difficulty does the patient currently have...   Turning over in bed (including adjusting bedclothes, sheets and blankets)? 4   Sitting down on and standing up from a chair with arms (e.g., wheelchair, bedside commode, etc.) 3    Moving from lying on back to sitting on the side of the bed? 4   How much help from another person does the patient currently need...   Moving to and from a bed to a chair (including a wheelchair)? 3   Need to walk in a hospital room? 3   Climbing 3-5 steps with a railing? 2   6 clicks Mobility Score 19   Activity Tolerance   Sitting in Chair 1 hour   Sitting Edge of Bed 10 min   Standing 10 min    Comments limited by dizziness, pallor and nausea associated with hypotension    Edema / Skin Assessment   Comments R LE dressing C/D/I   Patient / Family Goals    Patient / Family Goal #1 to go home    Short Term Goals    Short Term Goal # 1 in 6 visits patient will demo all functional transfers Herson for asfe DC home    Short Term Goal # 2 in 6 visits patient will ambulate 200' Herson for safe DC home    Short Term Goal # 3 in 6 visits patient will navigate 1 stairs no rails with Arelis for safe DC home    Education Group   Education Provided Role of Physical Therapist;Stair Training;Gait Training;Use of Assistive Device   Role of Physical Therapist Patient Response Patient;Acceptance;Explanation;Demonstration;Verbal Demonstration   Gait Training Patient Response Patient;Significant Other;Acceptance;Explanation;Demonstration;Verbal Demonstration;Reinforcement Needed   Stair Training Patient Response Patient;Significant Other;Acceptance;Explanation;Demonstration;Verbal Demonstration;Action Demonstration   Use of Assistive Device Patient Response Patient;Acceptance;Explanation;Demonstration;Verbal Demonstration;Action Demonstration   Problem List    Problems Pain;Impaired Transfers;Impaired Ambulation;Functional Strength Deficit;Impaired Balance;Decreased Activity Tolerance   Anticipated Discharge Equipment and Recommendations   DC Equipment Recommendations Front-Wheel Walker   Discharge Recommendations Recommend home health for continued physical therapy services       Cathy Castellon, PT, DPT, GCS

## 2021-07-26 NOTE — OP REPORT
Pre-operative Dx: Displaced right intertrochanteric hip fracture  Post-operative Dx: same   Procedure:  Intramedullary nailing of right hip fracture  Surgeon:  Dr. Kyle Todd MD  Assistants: Adriel Ham MD  Anesthesia: Dr. Espinosa.  General.  EBL:  100cc  Drains:  None  Complications:  None  Indications: She is a 66-year-old female who fell carrying groceries earlier today and sustained a right intertrochanteric hip fracture.  We discussed the options and she was indicated for fixation with cephalomedullary nail.  We reviewed the risks, benefits, and alternatives of surgery, and she elected to proceed.    Implants:  [facility] short nail with a 95 mm lag screw and a 27.5 mm distal interlock     Description of Procedure:      Following appropriate pre-operative evaluation and consent, the patient was brought to the operative room. Pre-operative anesthesia was administered. Patient was positioned supine on the operative table with appropriate padding of the extremities. Traction was applied through the table and appropriate reduction and imaging were confirmed.  Sterile prep and draping of the surgical field was completed. Pre-operative antibiotics were administered.      A lateral incision was made proximal to the greater trochanter and a guide wire was placed into the appropriate starting position on biplanar fluoroscopy.  An opening reamer was passed, then a ball-tipped guide wire was placed across the fracture toward the knee because of her very small size.  The short nail was placed and seated the appropriate depth.  A separate incision was made for the lag screw.  A guide pin was placed into the center center position of the femoral head.  The lag screw path was drilled and measured, and then a 95 mm lag screw was placed, confirming there was no intraarticular penetration.  We then took traction off the table and compressed the fracture through the jig.  The locking screw was fully seated.  Final fluoro shots of  the proximal femur were obtained, and then the distal interlock was prepared and placed through the jig.  Final fluoro shots of the fracture and distal nail were satisfactory.  Wounds were thoroughly irrigated and closed in layers.  Sterile dressings were applied and the patient was turned over to anesthesia in stable condition without apparent complication.    Postoperative plan: Weightbearing as tolerated, DVT ppx will be aspirin 81 mg twice a day for 4 weeks, PT/OT eval.  Followup in 2 weeks in clinic.

## 2021-07-26 NOTE — CARE PLAN
Problem: Knowledge Deficit - Standard  Goal: Patient and family/care givers will demonstrate understanding of plan of care, disease process/condition, diagnostic tests and medications  Description: Document in Patient Education    1.  Patient and family/caregiver oriented to unit, equipment, visitation policy and means for communicating concern    2.  Complete/review Learning Assessment  3.  Assess knowledge level of disease process/condition, treatment plan, diagnostic tests and medications  4.  Explain disease process/condition, treatment plan, diagnostic tests and medications  Outcome: Progressing  Note: Pt able to make needs known, Able to verbalize understanding of POC     Problem: Pain - Standard  Goal: Alleviation of pain or a reduction in pain to the patient’s comfort goal  Description: Document on Vitals flowsheet    1.  Document pain using the appropriate pain scale per order or unit policy    2.  Educate and implement non-pharmacologic comfort measures (i.e. relaxation, distraction, massage, cold/heat therapy, etc.)  3.  Pain management medications as ordered  4.  Reassess pain after pain med administration per policy  5.  If opiods administered assess patient's response to pain medication is appropriate per POSS sedation scale  6.  Follow pain management plan developed in collaboration with patient and interdisciplinary team (including palliative care or pain specialists if applicable)      Outcome: Progressing  Flowsheets  Taken 7/26/2021 0857  OB Pain Intervention: Declines  Taken 7/26/2021 0847  Non Verbal Scale:   Calm   Unlabored Breathing  Pain Rating Scale (NPRS): 4  Note: PRN and scheduled pain medication, repositioning, rest and ambulation encouraged.

## 2021-07-26 NOTE — ANESTHESIA PROCEDURE NOTES
Airway    Date/Time: 7/25/2021 5:40 PM  Performed by: David Espinosa M.D.  Authorized by: David Espinosa M.D.     Location:  OR  Urgency:  Elective  Difficult Airway: No    Indications for Airway Management:  Anesthesia      Spontaneous Ventilation: absent    Sedation Level:  Deep  Preoxygenated: Yes    Patient Position:  Sniffing  Final Airway Type:  Endotracheal airway  Final Endotracheal Airway:  ETT  Cuffed: Yes    Technique Used for Successful ETT Placement:  Direct laryngoscopy    Insertion Site:  Oral  Blade Type:  Mitchell  Laryngoscope Blade/Videolaryngoscope Blade Size:  3  ETT Size (mm):  7.5  Measured from:  Lips  ETT to Lips (cm):  21  Placement Verified by: auscultation and capnometry    Cormack-Lehane Classification:  Grade IIa - partial view of glottis  Number of Attempts at Approach:  1  Number of Other Approaches Attempted:  0

## 2021-07-26 NOTE — FACE TO FACE
Face to Face Note  -  Durable Medical Equipment    Arnold Douglas M.D. - NPI: [id number]  I certify that this patient is under my care and that they have had a durable medical equipment(DME)face to face encounter by myself that meets the physician DME face-to-face encounter requirements with this patient on:    Date of encounter:   Patient:                    MRN:                       YOB: 2021  Pamela Watkins  [id number]  1955     The encounter with the patient was in whole, or in part, for the following medical condition, which is the primary reason for durable medical equipment:  Other - Right hip fracture    I certify that, based on my findings, the following durable medical equipment is medically necessary:  Walkers.    HOME O2 Saturation Measurements:(Values must be present for Home Oxygen orders)         ,     ,         My Clinical findings support the need for the above equipment due to:  Abnormal Gait    Supporting Symptoms:     ------------------------------------------------------------------------------------------------------------------    Face to Face Supporting Documentation - Home Health    The encounter with this patient was in whole or in part the primary reason for home health admission.    Date of encounter:   Patient:                    MRN:                       YOB: 2021  Pamela Watkins  [id number]  1955     Home health to see patient for:  Physical Therapy evaluation and treatment and Occupational therapy evaluation and treatment    Skilled need for:  Recent Deterioration of Health Status Right hip fracture, fall    Skilled nursing interventions to include:  Comment: PT OT    Homebound evidenced status by:  Needs the assistance of another person in order to leave the home. Leaving home must require a considerable and taxing effort. There must exist a normal inability to leave the home.    Community Physician to provide follow up care:  Jayden Kellogg P.A.-C.     Optional Interventions    Wound information & treatment:    Home Infusion Therapy orders:    Line/Drain/Airway:    I certify the face to face encounter for this home care referral meets the CMS requirements and the encounter/clinical assessment with the patient was, in whole, or in part, for the medical condition(s) listed above, which is the primary reason for home health care. Based on my clinical findings: the service(s) are medically necessary, support the need for home health care, and the homebound criteria are met.  I certify that this patient has had a face to face encounter by myself.  Arnold Douglas M.D. - NPI: [id number]    *Debility, frailty and advanced age in the absence of an acute deterioration or exacerbation of a condition do not qualify a patient for home health.

## 2021-07-27 LAB
ALBUMIN SERPL BCP-MCNC: 3.6 G/DL (ref 3.2–4.9)
ALBUMIN/GLOB SERPL: 1.6 G/DL
ALP SERPL-CCNC: 57 U/L (ref 30–99)
ALT SERPL-CCNC: 11 U/L (ref 2–50)
ANION GAP SERPL CALC-SCNC: 7 MMOL/L (ref 7–16)
AST SERPL-CCNC: 19 U/L (ref 12–45)
BASOPHILS # BLD AUTO: 0.6 % (ref 0–1.8)
BASOPHILS # BLD: 0.05 K/UL (ref 0–0.12)
BILIRUB SERPL-MCNC: 0.7 MG/DL (ref 0.1–1.5)
BUN SERPL-MCNC: 13 MG/DL (ref 8–22)
CALCIUM SERPL-MCNC: 8.4 MG/DL (ref 8.5–10.5)
CHLORIDE SERPL-SCNC: 102 MMOL/L (ref 96–112)
CO2 SERPL-SCNC: 27 MMOL/L (ref 20–33)
CREAT SERPL-MCNC: 0.56 MG/DL (ref 0.5–1.4)
D DIMER PPP IA.FEU-MCNC: 2.11 UG/ML (FEU) (ref 0–0.5)
EOSINOPHIL # BLD AUTO: 0.05 K/UL (ref 0–0.51)
EOSINOPHIL NFR BLD: 0.6 % (ref 0–6.9)
ERYTHROCYTE [DISTWIDTH] IN BLOOD BY AUTOMATED COUNT: 45.1 FL (ref 35.9–50)
GLOBULIN SER CALC-MCNC: 2.2 G/DL (ref 1.9–3.5)
GLUCOSE SERPL-MCNC: 106 MG/DL (ref 65–99)
HCT VFR BLD AUTO: 29.8 % (ref 37–47)
HGB BLD-MCNC: 9.8 G/DL (ref 12–16)
IMM GRANULOCYTES # BLD AUTO: 0.03 K/UL (ref 0–0.11)
IMM GRANULOCYTES NFR BLD AUTO: 0.3 % (ref 0–0.9)
LYMPHOCYTES # BLD AUTO: 1.06 K/UL (ref 1–4.8)
LYMPHOCYTES NFR BLD: 12 % (ref 22–41)
MAGNESIUM SERPL-MCNC: 2 MG/DL (ref 1.5–2.5)
MCH RBC QN AUTO: 30.3 PG (ref 27–33)
MCHC RBC AUTO-ENTMCNC: 32.9 G/DL (ref 33.6–35)
MCV RBC AUTO: 92.3 FL (ref 81.4–97.8)
MONOCYTES # BLD AUTO: 0.94 K/UL (ref 0–0.85)
MONOCYTES NFR BLD AUTO: 10.7 % (ref 0–13.4)
NEUTROPHILS # BLD AUTO: 6.68 K/UL (ref 2–7.15)
NEUTROPHILS NFR BLD: 75.8 % (ref 44–72)
NRBC # BLD AUTO: 0 K/UL
NRBC BLD-RTO: 0 /100 WBC
PHOSPHATE SERPL-MCNC: 2.4 MG/DL (ref 2.5–4.5)
PLATELET # BLD AUTO: 161 K/UL (ref 164–446)
PMV BLD AUTO: 10.3 FL (ref 9–12.9)
POTASSIUM SERPL-SCNC: 3.8 MMOL/L (ref 3.6–5.5)
PROT SERPL-MCNC: 5.8 G/DL (ref 6–8.2)
RBC # BLD AUTO: 3.23 M/UL (ref 4.2–5.4)
SODIUM SERPL-SCNC: 136 MMOL/L (ref 135–145)
WBC # BLD AUTO: 8.8 K/UL (ref 4.8–10.8)

## 2021-07-27 PROCEDURE — A9270 NON-COVERED ITEM OR SERVICE: HCPCS | Performed by: ORTHOPAEDIC SURGERY

## 2021-07-27 PROCEDURE — 85025 COMPLETE CBC W/AUTO DIFF WBC: CPT

## 2021-07-27 PROCEDURE — 85379 FIBRIN DEGRADATION QUANT: CPT

## 2021-07-27 PROCEDURE — 99232 SBSQ HOSP IP/OBS MODERATE 35: CPT | Performed by: INTERNAL MEDICINE

## 2021-07-27 PROCEDURE — 770006 HCHG ROOM/CARE - MED/SURG/GYN SEMI*

## 2021-07-27 PROCEDURE — A9270 NON-COVERED ITEM OR SERVICE: HCPCS | Performed by: INTERNAL MEDICINE

## 2021-07-27 PROCEDURE — 97530 THERAPEUTIC ACTIVITIES: CPT

## 2021-07-27 PROCEDURE — 700111 HCHG RX REV CODE 636 W/ 250 OVERRIDE (IP): Performed by: INTERNAL MEDICINE

## 2021-07-27 PROCEDURE — 700102 HCHG RX REV CODE 250 W/ 637 OVERRIDE(OP): Performed by: INTERNAL MEDICINE

## 2021-07-27 PROCEDURE — 83735 ASSAY OF MAGNESIUM: CPT

## 2021-07-27 PROCEDURE — 700102 HCHG RX REV CODE 250 W/ 637 OVERRIDE(OP): Performed by: ORTHOPAEDIC SURGERY

## 2021-07-27 PROCEDURE — 97116 GAIT TRAINING THERAPY: CPT

## 2021-07-27 PROCEDURE — 36415 COLL VENOUS BLD VENIPUNCTURE: CPT

## 2021-07-27 PROCEDURE — 80053 COMPREHEN METABOLIC PANEL: CPT

## 2021-07-27 PROCEDURE — 84100 ASSAY OF PHOSPHORUS: CPT

## 2021-07-27 RX ADMIN — POLYETHYLENE GLYCOL 3350 1 PACKET: 17 POWDER, FOR SOLUTION ORAL at 04:10

## 2021-07-27 RX ADMIN — ASPIRIN 81 MG: 81 TABLET, COATED ORAL at 09:42

## 2021-07-27 RX ADMIN — OXYCODONE 2.5 MG: 5 TABLET ORAL at 04:10

## 2021-07-27 RX ADMIN — DOCUSATE SODIUM 50 MG AND SENNOSIDES 8.6 MG 2 TABLET: 8.6; 5 TABLET, FILM COATED ORAL at 04:10

## 2021-07-27 RX ADMIN — ATORVASTATIN CALCIUM 20 MG: 20 TABLET, FILM COATED ORAL at 17:31

## 2021-07-27 RX ADMIN — ASPIRIN 81 MG: 81 TABLET, COATED ORAL at 19:58

## 2021-07-27 RX ADMIN — ENOXAPARIN SODIUM 40 MG: 40 INJECTION SUBCUTANEOUS at 04:10

## 2021-07-27 ASSESSMENT — ENCOUNTER SYMPTOMS
DOUBLE VISION: 0
HEADACHES: 0
SPEECH CHANGE: 0
BLURRED VISION: 0
ORTHOPNEA: 0
FLANK PAIN: 0
SPUTUM PRODUCTION: 0
CHILLS: 0
FOCAL WEAKNESS: 0
FEVER: 0
PHOTOPHOBIA: 0
NECK PAIN: 0
HALLUCINATIONS: 0
POLYDIPSIA: 0
HEMOPTYSIS: 0
BRUISES/BLEEDS EASILY: 0
NAUSEA: 0
PALPITATIONS: 0
VOMITING: 0
WEIGHT LOSS: 0
COUGH: 0
TREMORS: 0
NERVOUS/ANXIOUS: 0
HEARTBURN: 0
BACK PAIN: 0

## 2021-07-27 ASSESSMENT — GAIT ASSESSMENTS
DISTANCE (FEET): 30
ASSISTIVE DEVICE: FRONT WHEEL WALKER
DEVIATION: BRADYKINETIC
GAIT LEVEL OF ASSIST: SUPERVISED

## 2021-07-27 ASSESSMENT — COGNITIVE AND FUNCTIONAL STATUS - GENERAL
STANDING UP FROM CHAIR USING ARMS: A LITTLE
SUGGESTED CMS G CODE MODIFIER MOBILITY: CK
WALKING IN HOSPITAL ROOM: A LITTLE
MOBILITY SCORE: 16
MOVING FROM LYING ON BACK TO SITTING ON SIDE OF FLAT BED: UNABLE
MOVING TO AND FROM BED TO CHAIR: A LITTLE
CLIMB 3 TO 5 STEPS WITH RAILING: A LOT

## 2021-07-27 ASSESSMENT — PAIN DESCRIPTION - PAIN TYPE
TYPE: ACUTE PAIN;SURGICAL PAIN
TYPE: SURGICAL PAIN
TYPE: ACUTE PAIN;SURGICAL PAIN
TYPE: ACUTE PAIN;SURGICAL PAIN

## 2021-07-27 ASSESSMENT — LIFESTYLE VARIABLES: SUBSTANCE_ABUSE: 0

## 2021-07-27 NOTE — CARE PLAN
The patient is Stable - Low risk of patient condition declining or worsening         Progress made toward(s) clinical / shift goals:      Problem: Knowledge Deficit - Standard  Goal: Patient and family/care givers will demonstrate understanding of plan of care, disease process/condition, diagnostic tests and medications  Outcome: Progressing  Note: Pt updated on plan of care. Pt encouraged to ask questions and questions were answered. Call light within reach. Pt reminded to use call light whenever needing assistance.      Problem: Pain - Standard  Goal: Alleviation of pain or a reduction in pain to the patient’s comfort goal  Outcome: Progressing  Note: Pt was experiencing some pain earlier in the night. Pain medication was given as needed and as ordered. Pt then able to sleep comfortably.        Patient is not progressing towards the following goals:  N/a

## 2021-07-27 NOTE — THERAPY
"Physical Therapy   Daily Treatment     Patient Name: Pamela Watkins  Age:  66 y.o., Sex:  female  Medical Record #: [id number]  Today's Date: 7/27/2021     Precautions: Fall Risk, Weight Bearing As Tolerated Right Lower Extremity    Assessment    Pt agreeable to PT session and mobilized as detailed below in chart, limited mainly by dizziness/wooziness with gait. Pt demonstrating safe transfers, gait, and bed mobility requiring Janet-CGA for all. Pt will continue to be followed for further mobility training while in house.     Plan    Continue current treatment plan.    DC Equipment Recommendations: Front-Wheel Walker  Discharge Recommendations: Recommend home health for continued physical therapy services      Subjective    \"I know they are having trouble with my oxygen requirements.\"     Objective       07/27/21 1030   Cognition    Cognition / Consciousness WDL   Level of Consciousness Alert   Comments pleasant and cooperative   Balance   Sitting Balance (Static) Fair +   Sitting Balance (Dynamic) Fair +   Standing Balance (Static) Fair   Standing Balance (Dynamic) Fair   Weight Shift Sitting Good   Weight Shift Standing Fair   Skilled Intervention Verbal Cuing   Comments w/FWW; no LOB   Gait Analysis   Gait Level Of Assist Supervised   Assistive Device Front Wheel Walker   Distance (Feet) 30   # of Times Distance was Traveled 2   Deviation Bradykinetic   # of Stairs Climbed 0   Weight Bearing Status WBAT RLE   Skilled Intervention Verbal Cuing   Comments pts gait limited by dizziness/wooziness with mobility; demonstrates increased R knee flexion and decreased DF with gait    Bed Mobility    Supine to Sit Minimal Assist   Sit to Supine   (not tested, up in chair post )   Scooting Supervised   Rolling Minimal Assist to Rt.   Skilled Intervention Verbal Cuing   Comments without bed accessories   Functional Mobility   Sit to Stand Minimal Assist   Bed, Chair, Wheelchair Transfer Minimal Assist   Toilet Transfers Minimal " Assist   Comments w/ FWW    Activity Tolerance   Sitting in Chair up in chair post session   Sitting Edge of Bed 5mins   Standing 10mins   Comments limited by dizziness with mobility   Patient / Family Goals    Patient / Family Goal #1 to go home    Short Term Goals    Short Term Goal # 1 in 6 visits patient will demo all functional transfers Herson for asfe DC home    Goal Outcome # 1 goal not met   Short Term Goal # 2 in 6 visits patient will ambulate 200' Herson for safe DC home    Goal Outcome # 2 Goal not met   Short Term Goal # 3 in 6 visits patient will navigate 1 stairs no rails with Arelis for safe DC home    Goal Outcome # 3   (goal not addressed today)   Education Group   Education Provided Role of Physical Therapist   Role of Physical Therapist Patient Response Patient;Acceptance;Explanation;Demonstration;Verbal Demonstration   Gait Training Patient Response Patient;Significant Other;Acceptance;Explanation;Demonstration;Verbal Demonstration;Reinforcement Needed   Use of Assistive Device Patient Response Patient;Acceptance;Explanation;Demonstration;Verbal Demonstration;Action Demonstration

## 2021-07-27 NOTE — DISCHARGE PLANNING
Anticipated Discharge Disposition: Home with HH and DME.    Action: Met with pt to discuss discharge plans. Pt needs FWW for home, bedside RN to notify traction. Pt may need home 02, eval to be done. Pt needs  , chose [facility], [facility], and  [facility].    Barriers to Discharge: pending HH, 02 eval and medical clearance.    Plan:F/U on referrals.

## 2021-07-27 NOTE — PROGRESS NOTES
Hospital Medicine Daily Progress Note    Date of Service  7/27/2021    Chief Complaint/Hospital Course  Pamela Watkins is a 66 y.o. female with past medical history of dyslipidemia, admitted 7/25/2021 with ground-level fall and right intertrochanteric right femur fracture.  Status post intramedullary nailing on 7/25 by Dr. Todd          Interval Problem Update  Patient seen and examined resting in bed    states that pain in the right hip significantly improved after the surgery.  She noted to be on 2 L, desaturated to 86% on room air.  Denies cough or shortness of breath. Will do a home O2 eval   Chest x-ray negative for acute cardiopulmonary disease.  CTPE neg for PE   PT recommended frontwheel walker, home health.  Placed referral for home health.      I have personally seen and examined the patient at bedside. I discussed the plan of care with patient.    Consultants/Specialty  orthopedics    Code Status  DNAR/DNI    Disposition  Patient is not medically cleared.   Anticipate discharge to To be discussed.  I have placed the appropriate orders for post-discharge needs.    Review of Systems  Review of Systems   Constitutional: Negative for chills, fever and weight loss.   HENT: Negative for ear pain, hearing loss and tinnitus.    Eyes: Negative for blurred vision, double vision and photophobia.   Respiratory: Negative for cough, hemoptysis and sputum production.    Cardiovascular: Negative for chest pain, palpitations and orthopnea.   Gastrointestinal: Negative for heartburn, nausea and vomiting.   Genitourinary: Negative for dysuria, flank pain, frequency and hematuria.   Musculoskeletal: Positive for joint pain. Negative for back pain and neck pain.   Skin: Negative for itching and rash.   Neurological: Negative for tremors, speech change, focal weakness and headaches.   Endo/Heme/Allergies: Negative for environmental allergies and polydipsia. Does not bruise/bleed easily.   Psychiatric/Behavioral: Negative for  hallucinations and substance abuse. The patient is not nervous/anxious.         Physical Exam  Temp:  [35.8 °C (96.5 °F)-37.3 °C (99.2 °F)] 36.8 °C (98.3 °F)  Pulse:  [] 85  Resp:  [16-18] 16  BP: ()/(44-62) 93/60  SpO2:  [87 %-96 %] 96 %    Physical Exam  Vitals and nursing note reviewed.   Constitutional:       General: She is not in acute distress.     Appearance: Normal appearance.   HENT:      Head: Normocephalic and atraumatic.      Nose: Nose normal.      Mouth/Throat:      Mouth: Mucous membranes are moist.   Eyes:      Extraocular Movements: Extraocular movements intact.      Pupils: Pupils are equal, round, and reactive to light.   Cardiovascular:      Rate and Rhythm: Normal rate and regular rhythm.   Pulmonary:      Effort: Pulmonary effort is normal.      Breath sounds: Normal breath sounds.   Abdominal:      General: Abdomen is flat. There is no distension.      Tenderness: There is no abdominal tenderness.   Musculoskeletal:         General: No swelling or deformity.      Cervical back: Normal range of motion and neck supple.      Right hip: Tenderness present. Decreased range of motion.   Skin:     General: Skin is warm and dry.   Neurological:      General: No focal deficit present.      Mental Status: She is alert and oriented to person, place, and time.   Psychiatric:         Mood and Affect: Mood normal.         Behavior: Behavior normal.         Fluids    Intake/Output Summary (Last 24 hours) at 7/27/2021 1305  Last data filed at 7/27/2021 0800  Gross per 24 hour   Intake 400 ml   Output --   Net 400 ml       Laboratory  Recent Labs     07/25/21  1218 07/26/21  0649 07/27/21  0741   WBC 8.9 9.1 8.8   RBC 4.12* 3.33* 3.23*   HEMOGLOBIN 12.6 10.1* 9.8*   HEMATOCRIT 38.1 30.7* 29.8*   MCV 92.5 92.2 92.3   MCH 30.6 30.3 30.3   MCHC 33.1* 32.9* 32.9*   RDW 44.5 44.9 45.1   PLATELETCT 229 171 161*   MPV 10.0 10.0 10.3     Recent Labs     07/25/21  1218 07/26/21  0649 07/27/21  0741  "  SODIUM 139 136 136   POTASSIUM 3.8 4.0 3.8   CHLORIDE 104 104 102   CO2 26 26 27   GLUCOSE 107* 139* 106*   BUN 18 15 13   CREATININE 0.78 0.72 0.56   CALCIUM 9.0 8.4* 8.4*             Recent Labs     07/26/21  0649   TRIGLYCERIDE 72   HDL 62   LDL 71       Imaging  CT-CTA CHEST PULMONARY ARTERY W/ RECONS   Final Result      1.  There is no CT evidence of acute pulmonary embolism.   2.  There is underlying emphysema with possible changes of COPD.            DX-CHEST-LIMITED (1 VIEW)   Final Result      No acute cardiopulmonary disease.      DX-FEMUR-2+ RIGHT   Final Result      Intraoperative fluoroscopic spot images as described above.      DX-PORTABLE FLUORO > 1 HOUR   Final Result      Intraoperative fluoroscopic spot images as described above.      DX-FEMUR-2+ RIGHT   Final Result      Intertrochanteric right femur fracture      DX-PELVIS-1 OR 2 VIEWS   Final Result      1.  Intratrochanteric fracture of RIGHT proximal femur.   2.  No pelvic fracture.           Assessment/Plan  * Femur fracture (HCC)  Assessment & Plan  X-ray in the ER reported: \"Intertrochanteric right femur fracture\"  Orthopedic surgery has been consulted, we appreciate further recommendations.  Status post surgical repair 7/25  Pain management.  PT recommended home health and front wheel walker which were ordered    Hypoxia  Assessment & Plan  Unclear etiology  Chest x-ray negative for acute process  D-dimer elevated CTPE neg for PE but showing emphysema   EKG reviewed, nonconcerning for MI  Continue incentive spirometry, supplemental oxygen, wean off as tolerated  Judicious use of opiates    Fall  Assessment & Plan  Patient had a ground-level fall today while carrying groceries.  The patient states that she tripped.  Physical therapy input noted    Mixed hyperlipidemia- (present on admission)  Assessment & Plan  Resume home medication.  Lipid panel tomorrow morning.    Smoker- (present on admission)  Assessment & Plan  Counseled on " lifestyle modifications.  The patient denied requiring nicotine replacement at this time.       VTE prophylaxis: enoxaparin ppx    I have performed a physical exam and reviewed and updated ROS and Plan today (7/27/2021). In review of yesterday's note (7/26/2021), there are no changes except as documented above.

## 2021-07-27 NOTE — DISCHARGE PLANNING
Care Transition Team Assessment  In the case of an emergency, pt's legal NOK is spouse Chetan Watkins     RNCM met with pt at bedside and obtained the information used in this assessment. Pt verified accuracy of facesheet. Pt lives in a single story home with spouse.  Pt uses [facility]. Prior to current hospitalization, pt was completely independent in ADLS/IADLS. Pt drives and is able to attend necessary MD appointments. Pt has a good support system. Pt denies any hx of substance use and denies any dx of mh.    Information Source:pt  Orientation Level: Oriented X4  Information Given By: Patient  Informant's Name:  (Pamela)  Who is responsible for making decisions for patient? : Patient         Elopement Risk  Legal Hold: No  Ambulatory or Self Mobile in Wheelchair: No-Not an Elopement Risk  Elopement Risk: Not at Risk for Elopement    Interdisciplinary Discharge Planning  Does Admitting Nurse Feel This Could be a Complex Discharge?: No  Primary Care Physician:  (Jayden BARCENAS)  Lives with - Patient's Self Care Capacity: Spouse  Patient or legal guardian wants to designate a caregiver: No  Housing / Facility: [address] [facility]  Do You Take your Prescribed Medications Regularly: Yes  Mobility Issues: No  Prior Services: None  Durable Medical Equipment: Not Applicable    Discharge Preparedness  What is your plan after discharge?: Home with help, Home health care  What are your discharge supports?: Spouse  Prior Functional Level: Ambulatory, Drives Self, Independent with Activities of Daily Living, Independent with Medication Management  Difficulity with ADLs: None  Difficulity with IADLs: None    Functional Assesment  Prior Functional Level: Ambulatory, Drives Self, Independent with Activities of Daily Living, Independent with Medication Management    Finances  Financial Barriers to Discharge: No  Prescription Coverage: Yes    Vision / Hearing Impairment  Right Eye Vision: Impaired, Wears Glasses  Left  Eye Vision: Impaired, Wears Glasses              Domestic Abuse  Have you ever been the victim of abuse or violence?: No    Psychological Assessment  History of Substance Abuse: None  History of Psychiatric Problems: No

## 2021-07-27 NOTE — PROGRESS NOTES
Assumed care of patient at 0700. Received report from REBECA Solis. Patient alert/orientated x4. Complains of some pain to R) leg, but has declined prn pain medication so far. Home walker delivered. Home o2 evaluation completed. Awaiting home o2 and home health approval. Call light within reach. Hourly rounds. Bed locked in lowest position. Will continue to implement plan of care

## 2021-07-27 NOTE — FACE TO FACE
"Face to Face Note  -  Durable Medical Equipment    Sean Christian M.D. - NPI: [id number]  I certify that this patient is under my care and that they had a durable medical equipment(DME)face to face encounter by myself that meets the physician DME face-to-face encounter requirements with this patient on:    Date of encounter:   Patient:                    MRN:                       YOB: 2021  Pamela Watkins  [id number]  1955     The encounter with the patient was in whole, or in part, for the following medical condition, which is the primary reason for durable medical equipment:  COPD    I certify that, based on my findings, the following durable medical equipment is medically necessary:  Oxygen.    HOME O2 Saturation Measurements:(Values must be present for Home Oxygen orders)  Room air sat at rest: 89  Room air sat with amb: 83  With liters of O2: 2, O2 sat at rest with O2: 91  With Liters of O2: 3, O2 sat with amb with O2 : 93  Is the patient mobile?: Yes    My Clinical findings support the need for the above equipment due to:  Hypoxia    Supporting Symptoms: The patient requires supplemental oxygen, as the following interventions have been tried with limited or no improvement: \"Bronchodilators and/or steroid inhalers, \"Oral and/or IV steroids, \"Ambulation with oximetry and \"Incentive spirometry    If patient feels more short of breath, they can go up to 6 liters per minute and contact healthcare provider.  "

## 2021-07-27 NOTE — DISCHARGE PLANNING
Received Choice form at 1519  Agency/Facility Name: [facility]  Referral sent per Choice form @ 2068

## 2021-07-27 NOTE — PROGRESS NOTES
"   Orthopaedic PA Progress Note    Interval changes:Mobilizing well, dressed for discharge, OOB in chair.  Clear for disposition (home/SNF/IRF) from Orthopaedic team standpoint.  Follow-Up: needs appointment with Dr. Todd at  [facility] at 10-14 days post-op for re-evaluation, staple removal and radiographs.      ROS - Patient denies any new issues. No chest pain, dyspnea, or fever.  Pain well controlled.    BP (!) 93/60   Pulse 85   Temp 36.8 °C (98.3 °F) (Temporal)   Resp 16   Ht 1.676 m (5' 6\")   Wt 59.8 kg (131 lb 13.4 oz)   SpO2 96%     Patient seen and examined  No acute distress  Breathing non labored  RRR  Surgical dressing is clean, dry, and intact. Patient clearly fires tibialis anterior, EHL, and gastrocnemius/soleus. Sensation is intact to light touch throughout superficial peroneal, deep peroneal, tibial, saphenous, and sural nerve distributions. Strong and palpable 2+ dorsalis pedis and posterior tibial pulses with capillary refill less than 2 seconds. No lower leg tenderness or discomfort.    Recent Labs     07/25/21  1218 07/26/21  0649 07/27/21  0741   WBC 8.9 9.1 8.8   RBC 4.12* 3.33* 3.23*   HEMOGLOBIN 12.6 10.1* 9.8*   HEMATOCRIT 38.1 30.7* 29.8*   MCV 92.5 92.2 92.3   MCH 30.6 30.3 30.3   MCHC 33.1* 32.9* 32.9*   RDW 44.5 44.9 45.1   PLATELETCT 229 171 161*   MPV 10.0 10.0 10.3       Active Hospital Problems    Diagnosis    • Hypoxia [R09.02]    • Fall [W19.XXXA]    • Femur fracture (HCC) [S72.90XA]    • Mixed hyperlipidemia [E78.2]    • Smoker [F17.200]        Assessment/Plan:  POD#2 S/P IMN R hip  Wt bearing status - AT  PT/OT-initiated  Wound care:dressings left in place  Drains - no  Cunha-no  Sutures/Staples out- 10-14 days post operatively  Antibiotics: complete  DVT Prophylaxis- TEDS/SCDs/Foot pumps.   Lovenox: 40 mg daily, Duration-until ambulatory > 150'  May transition to  mg PO BID x 28 days on DC  Future Procedures - none planned  Case Coordination for " Discharge Planning - Disposition likely home soon pending Med/PT/OT clearance

## 2021-07-28 ENCOUNTER — PATIENT MESSAGE (OUTPATIENT)
Dept: HEALTH INFORMATION MANAGEMENT | Facility: OTHER | Age: 66
End: 2021-07-28

## 2021-07-28 VITALS
WEIGHT: 131.84 LBS | SYSTOLIC BLOOD PRESSURE: 100 MMHG | RESPIRATION RATE: 16 BRPM | TEMPERATURE: 98.5 F | OXYGEN SATURATION: 90 % | HEART RATE: 91 BPM | DIASTOLIC BLOOD PRESSURE: 60 MMHG | BODY MASS INDEX: 21.19 KG/M2 | HEIGHT: 66 IN

## 2021-07-28 PROCEDURE — 700102 HCHG RX REV CODE 250 W/ 637 OVERRIDE(OP): Performed by: ORTHOPAEDIC SURGERY

## 2021-07-28 PROCEDURE — 700111 HCHG RX REV CODE 636 W/ 250 OVERRIDE (IP): Performed by: INTERNAL MEDICINE

## 2021-07-28 PROCEDURE — 99239 HOSP IP/OBS DSCHRG MGMT >30: CPT | Performed by: INTERNAL MEDICINE

## 2021-07-28 PROCEDURE — A9270 NON-COVERED ITEM OR SERVICE: HCPCS | Performed by: ORTHOPAEDIC SURGERY

## 2021-07-28 RX ORDER — ASPIRIN 325 MG
325 TABLET ORAL 2 TIMES DAILY
Qty: 56 TABLET | Refills: 0 | Status: SHIPPED | OUTPATIENT
Start: 2021-07-28 | End: 2021-08-25

## 2021-07-28 RX ORDER — OXYCODONE HYDROCHLORIDE 5 MG/1
5 TABLET ORAL EVERY 4 HOURS PRN
Qty: 18 TABLET | Refills: 0 | Status: SHIPPED | OUTPATIENT
Start: 2021-07-28 | End: 2021-07-31

## 2021-07-28 RX ADMIN — ASPIRIN 81 MG: 81 TABLET, COATED ORAL at 08:35

## 2021-07-28 RX ADMIN — ENOXAPARIN SODIUM 40 MG: 40 INJECTION SUBCUTANEOUS at 04:15

## 2021-07-28 ASSESSMENT — PAIN DESCRIPTION - PAIN TYPE
TYPE: SURGICAL PAIN

## 2021-07-28 NOTE — DISCHARGE PLANNING
"Agency/Facility Name: [facility]  Spoke To: Joyce  Outcome: Would like to know if the MD wants the pt to have an oxygen conserving device.  The RN sent the MD a voalte message.  MD said \"no\".  JULIO called Joyce back stating \"MD said no\".  Joyce gave permission for a tank of O2 to be taken from the [facility] closet.    JULIO sent a voalte message to [facility], requesting one tank of O2 be delivered to the pt from the [facility] closet.  Per Dhruv, we are out of tanks.  JULIO called and spoke to Joyce stating \"we are out of tanks\".  Joyce will have  deliver a tank to the pt and they will re-stock the [facility] closet.      "

## 2021-07-28 NOTE — DISCHARGE PLANNING
Anticipated Discharge Disposition: Home with  HH,02, and FWW     Action: Home 02 and FWW have been delivered, [facility] accepted. Pt is ready for discharge.    Barriers to Discharge: none    Plan: Discharge to home.

## 2021-07-28 NOTE — DISCHARGE PLANNING
Agency/Facility Name: [facility]  Spoke To:  Julio  Outcome: Would like to know if the patient is established with PCP on facesheet.      RNCM called the patient's PCP office and had to leave a message.  Patient did see PCP last Fall.

## 2021-07-28 NOTE — CARE PLAN
The patient is Stable - Low risk of patient condition declining or worsening    Shift Goals  Clinical Goals: Patient will maintain oxygen saturations 90% or greater this shift  Patient Goals: Patient will discharge home 7/28  Family Goals: n/a    Progress made toward(s) clinical / shift goals:  Patient has maintained oxygen saturations greater than 90% on 2-3L NC this shift. Plan to d/c 7/28 pending home o2 and home health approval      Problem: Knowledge Deficit - Standard  Goal: Patient and family/care givers will demonstrate understanding of plan of care, disease process/condition, diagnostic tests and medications  Outcome: Progressing     Problem: Pain - Standard  Goal: Alleviation of pain or a reduction in pain to the patient’s comfort goal  Outcome: Progressing       Patient is not progressing towards the following goals:

## 2021-07-28 NOTE — DISCHARGE PLANNING
Received Choice form at 4699  Agency/Facility Name: [facility]  Referral sent per Choice form @ 0760

## 2021-07-28 NOTE — DISCHARGE PLANNING
"Agency/Facility Name: [facility]  Spoke To: Joyce  Outcome: Referral accepted.  DPA called stating \"the [facility] closet is empty\".  Joyce will have the  deliver a tank of O2 to the pt's room and they are going to re-stock the closet today.        "

## 2021-07-28 NOTE — DISCHARGE INSTRUCTIONS
Discharge Instructions    Discharged to home by car with relative. Discharged via wheelchair, hospital escort: Yes.  Special equipment needed: Oxygen and Walker    Be sure to schedule a follow-up appointment with your primary care doctor or any specialists as instructed.     Discharge Plan:   Diet Plan: Discussed  Activity Level: Discussed  Confirmed Follow up Appointment: Patient to Call and Schedule Appointment  Confirmed Symptoms Management: Discussed  Medication Reconciliation Updated: Yes    I understand that a diet low in cholesterol, fat, and sodium is recommended for good health. Unless I have been given specific instructions below for another diet, I accept this instruction as my diet prescription.   Other diet: Regular    Special Instructions: Discharge instructions for the Orthopedic Patient    Follow up with Primary Care Physician within 2 weeks of discharge to home, regarding:  Review of medications and diagnostic testing.  Surveillance for medical complications.  Workup and treatment of osteoporosis, if appropriate.     -Is this a Hip/Knee/Shoulder Joint Replacement patient? No    -Is this patient being discharged with medication to prevent blood clots?  Yes, Aspirin Aspirin, ASA oral tablets  What is this medicine?  ASPIRIN (AS pir in) is a pain reliever. It is used to treat mild pain and fever. This medicine is also used as directed by a doctor to prevent and to treat heart attacks, to prevent strokes and blood clots, and to treat arthritis or inflammation.  This medicine may be used for other purposes; ask your health care provider or pharmacist if you have questions.  COMMON BRAND NAME(S): Aspir-Low, Aspir-Sabrina, Aspirtab, Raulito Advanced Aspirin, Raulito Aspirin, Raulito Aspirin Extra Strength, Raulito Aspirin Plus, Raulito Extra Strength, Raulito Extra Strength Plus, Raulito Genuine Aspirin, Raulito Womens Aspirin, Bufferin, Bufferin Extra Strength, Bufferin Low Dose  What should I tell my health care provider  before I take this medicine?  They need to know if you have any of these conditions:  · anemia  · asthma  · bleeding problems  · child with chickenpox, the flu, or other viral infection  · diabetes  · gout  · if you frequently drink alcohol containing drinks  · kidney disease  · liver disease  · low level of vitamin K  · lupus  · smoke tobacco  · stomach ulcers or other problems  · an unusual or allergic reaction to aspirin, tartrazine dye, other medicines, dyes, or preservatives  · pregnant or trying to get pregnant  · breast-feeding  How should I use this medicine?  Take this medicine by mouth with a glass of water. Follow the directions on the package or prescription label. You can take this medicine with or without food. If it upsets your stomach, take it with food. Do not take your medicine more often than directed.  Talk to your pediatrician regarding the use of this medicine in children. While this drug may be prescribed for children as young as 12 years of age for selected conditions, precautions do apply. Children and teenagers should not use this medicine to treat chicken pox or flu symptoms unless directed by a doctor.  Patients over 65 years old may have a stronger reaction and need a smaller dose.  Overdosage: If you think you have taken too much of this medicine contact a poison control center or emergency room at once.  NOTE: This medicine is only for you. Do not share this medicine with others.  What if I miss a dose?  If you are taking this medicine on a regular schedule and miss a dose, take it as soon as you can. If it is almost time for your next dose, take only that dose. Do not take double or extra doses.  What may interact with this medicine?  Do not take this medicine with any of the following medications:  · cidofovir  · ketorolac  · probenecid  This medicine may also interact with the following medications:  · alcohol  · alendronate  · bismuth subsalicylate  · flavocoxid  · herbal  supplements like feverfew, garlic, suleman, ginkgo biloba, horse chestnut  · medicines for diabetes or glaucoma like acetazolamide, methazolamide  · medicines for gout  · medicines that treat or prevent blood clots like enoxaparin, heparin, ticlopidine, warfarin  · other aspirin and aspirin-like medicines  · NSAIDs, medicines for pain and inflammation, like ibuprofen or naproxen  · pemetrexed  · sulfinpyrazone  · varicella live vaccine  This list may not describe all possible interactions. Give your health care provider a list of all the medicines, herbs, non-prescription drugs, or dietary supplements you use. Also tell them if you smoke, drink alcohol, or use illegal drugs. Some items may interact with your medicine.  What should I watch for while using this medicine?  If you are treating yourself for pain, tell your doctor or health care professional if the pain lasts more than 10 days, if it gets worse, or if there is a new or different kind of pain. Tell your doctor if you see redness or swelling. Also, check with your doctor if you have a fever that lasts for more than 3 days. Only take this medicine to prevent heart attacks or blood clotting if prescribed by your doctor or health care professional.  Do not take aspirin or aspirin-like medicines with this medicine. Too much aspirin can be dangerous. Always read the labels carefully.  This medicine can irritate your stomach or cause bleeding problems. Do not smoke cigarettes or drink alcohol while taking this medicine. Do not lie down for 30 minutes after taking this medicine to prevent irritation to your throat.  If you are scheduled for any medical or dental procedure, tell your healthcare provider that you are taking this medicine. You may need to stop taking this medicine before the procedure.  This medicine may be used to treat migraines. If you take migraine medicines for 10 or more days a month, your migraines may get worse. Keep a diary of headache days  and medicine use. Contact your healthcare professional if your migraine attacks occur more frequently.  What side effects may I notice from receiving this medicine?  Side effects that you should report to your doctor or health care professional as soon as possible:  · allergic reactions like skin rash, itching or hives, swelling of the face, lips, or tongue  · breathing problems  · changes in hearing, ringing in the ears  · confusion  · general ill feeling or flu-like symptoms  · pain on swallowing  · redness, blistering, peeling or loosening of the skin, including inside the mouth or nose  · signs and symptoms of bleeding such as bloody or black, tarry stools; red or dark-brown urine; spitting up blood or brown material that looks like coffee grounds; red spots on the skin; unusual bruising or bleeding from the eye, gums, or nose  · trouble passing urine or change in the amount of urine  · unusually weak or tired  · yellowing of the eyes or skin  Side effects that usually do not require medical attention (report to your doctor or health care professional if they continue or are bothersome):  · diarrhea or constipation  · headache  · nausea, vomiting  · stomach gas, heartburn  This list may not describe all possible side effects. Call your doctor for medical advice about side effects. You may report side effects to FDA at 6-041-FDA-4215.  Where should I keep my medicine?  Keep out of the reach of children.  Store at room temperature between 15 and 30 degrees C (59 and 86 degrees F). Protect from heat and moisture. Do not use this medicine if it has a strong vinegar smell. Throw away any unused medicine after the expiration date.  NOTE: This sheet is a summary. It may not cover all possible information. If you have questions about this medicine, talk to your doctor, pharmacist, or health care provider.  © 2020 [employer] (2018-01-30 10:42:13)      · Is patient discharged on Warfarin / Coumadin?   No      Depression / Suicide Risk    As you are discharged from this [facility] facility, it is important to learn how to keep safe from harming yourself.    Recognize the warning signs:  · Abrupt changes in personality, positive or negative- including increase in energy   · Giving away possessions  · Change in eating patterns- significant weight changes-  positive or negative  · Change in sleeping patterns- unable to sleep or sleeping all the time   · Unwillingness or inability to communicate  · Depression  · Unusual sadness, discouragement and loneliness  · Talk of wanting to die  · Neglect of personal appearance   · Rebelliousness- reckless behavior  · Withdrawal from people/activities they love  · Confusion- inability to concentrate     If you or a loved one observes any of these behaviors or has concerns about self-harm, here's what you can do:  · Talk about it- your feelings and reasons for harming yourself  · Remove any means that you might use to hurt yourself (examples: pills, rope, extension cords, firearm)  · Get professional help from the community (Mental Health, Substance Abuse, psychological counseling)  · Do not be alone:Call your Safe Contact- someone whom you trust who will be there for you.  · Call your local CRISIS HOTLINE 756-5949 or 403-433-5746  · Call your local Children's Mobile Crisis Response Team [place] (741) 942-3309 or www.Smart Panel  · Call the toll free National Suicide Prevention Hotlines   · National Suicide Prevention Lifeline 820-313-AQQA (9293)  · National Hope Line Network 800-SUICIDE (145-5524)      Intramedullary Nailing of Hip Fracture, Care After  This sheet gives you information about how to care for yourself after your procedure. Your health care provider may also give you more specific instructions. If you have problems or questions, contact your health care provider.  What can I expect after the surgery?  After the procedure, it is common to have these  symptoms in your hip area:  · Pain.  · Swelling.  · Tenderness.  · Stiffness and weakness.  Follow these instructions at home:  Medicines  · Take over-the-counter and prescription medicines only as told by your health care provider.  · Ask your health care provider if the medicine prescribed to you:  ? Requires you to avoid driving or using heavy machinery.  ? Can cause constipation. You may need to take actions to prevent or treat constipation, such as:  § Drink enough fluid to keep your urine pale yellow.  § Take over-the-counter or prescription medicines.  § Eat foods that are high in fiber, such as beans, whole grains, and fresh fruits and vegetables.  § Limit foods that are high in fat and processed sugars, such as fried or sweet foods.  Bathing  · Do not take baths, swim, or use a hot tub until your health care provider approves. Ask your health care provider if you may take showers. You may only be allowed to take sponge baths.  · Keep your bandage (dressing) dry if you shower or take a sponge bath.  Incision care    · Follow instructions from your health care provider about how to take care of your incision. Make sure you:  ? Wash your hands with soap and water before and after you change your dressing. If soap and water are not available, use hand .  ? Change your dressing as told by your health care provider.  ? Leave stitches (sutures), skin glue, or adhesive strips in place. These skin closures may need to stay in place for 2 weeks or longer. If adhesive strip edges start to loosen and curl up, you may trim the loose edges. Do not remove adhesive strips completely unless your health care provider tells you to do that.  · Check your incision area every day for signs of infection. Check for:  ? More redness, swelling, or pain.  ? Fluid or blood.  ? Warmth.  ? Pus or a bad smell.  Managing pain, stiffness, and swelling    · If directed, put ice on the affected area.  ? Put ice in a plastic  bag.  ? Place a towel between your skin and the bag.  ? Leave the ice on for 20 minutes, 2-3 times a day.  · Move your toes often to reduce stiffness and swelling.  · Raise (elevate) the injured area above the level of your heart while you are lying down.  Activity  · Rest as told by your health care provider.  · Use your crutches or walker as told by your health care provider. Your health care provider will let you know how much weight you can put on your leg. Your health care provider will do X-rays to check bone healing. As healing progresses, you may be allowed to put more weight on your leg.  · Avoid sitting for a long time without moving. Get up to take short walks every 1-2 hours. This is important to improve blood flow and breathing. Ask for help if you feel weak or unsteady.  · Keep all your physical therapy appointments.  · Do exercises as told by your health care provider. These exercises will prevent weakness and stiffness in your hip.  · Return to your normal activities as told by your health care provider. Ask your health care provider what activities are safe for you. It may take several months to heal completely.  · Ask your health care provider when it is safe to drive.  General instructions  · Do not use any products that contain nicotine or tobacco, such as cigarettes, e-cigarettes, and chewing tobacco. These can delay bone healing after surgery. If you need help quitting, ask your health care provider.  · Take steps to prevent falls at home, such as removing throw rugs and tripping hazards.  · Keep all follow-up visits as told by your health care provider. This is important.  Contact a health care provider if:  · You are not getting relief from your pain medicine.  · You have more redness, swelling, or pain around your incision.  · You have fluid or blood coming from your incision.  · Your incision feels warm to the touch.  · You have pus or a bad smell coming from your incision.  Get help right  away if:  · You have a fever and chills.  · You have chest pain or trouble breathing.  · Your incision breaks open.  · You have severe pain that does not get better with medicine.  Summary  · After your surgery, it is normal to have some pain, swelling, and tenderness.  · Take over-the-counter and prescription medicines only as told by your health care provider.  · Follow instructions from your health care provider about how to take care of your incision. Check your incision area every day for signs of infection.  · Return to your normal activities as told by your health care provider. Ask your health care provider what activities are safe for you.  · It may take several months to heal completely. Keep all follow-up visits as told by your health care provider.  This information is not intended to replace advice given to you by your health care provider. Make sure you discuss any questions you have with your health care provider.  Document Released: 10/21/2019 Document Revised: 10/21/2019 Document Reviewed: 10/21/2019  [employer] Patient Education © 2020 [employer]    Oxycodone tablets or capsules  What is this medicine?  OXYCODONE (ox i KOE done) is a pain reliever. It is used to treat moderate to severe pain.  This medicine may be used for other purposes; ask your health care provider or pharmacist if you have questions.  COMMON BRAND NAME(S): Dazidox, Endocodone, Oxaydo, OXECTA, OxyIR, Percolone, Roxicodone, Roxybond  What should I tell my health care provider before I take this medicine?  They need to know if you have any of these conditions:  · Silvano's disease  · brain tumor  · head injury  · heart disease  · history of drug or alcohol abuse problem  · if you often drink alcohol  · kidney disease  · liver disease  · lung or breathing disease, like asthma  · mental illness  · pancreatic disease  · seizures  · thyroid disease  · an unusual or allergic reaction to oxycodone, codeine, hydrocodone, morphine, other  medicines, foods, dyes, or preservatives  · pregnant or trying to get pregnant  · breast-feeding  How should I use this medicine?  Take this medicine by mouth with a glass of water. Follow the directions on the prescription label. You can take it with or without food. If it upsets your stomach, take it with food. Take your medicine at regular intervals. Do not take it more often than directed. Do not stop taking except on your doctor's advice.  Some brands of this medicine, like Oxecta, have special instructions. Ask your doctor or pharmacist if these directions are for you: Do not cut, crush or chew this medicine. Swallow only one tablet at a time. Do not wet, soak, or lick the tablet before you take it.  A special MedGuide will be given to you by the pharmacist with each prescription and refill. Be sure to read this information carefully each time.  Talk to your pediatrician regarding the use of this medicine in children. Special care may be needed.  Overdosage: If you think you have taken too much of this medicine contact a poison control center or emergency room at once.  NOTE: This medicine is only for you. Do not share this medicine with others.  What if I miss a dose?  If you miss a dose, take it as soon as you can. If it is almost time for your next dose, take only that dose. Do not take double or extra doses.  What may interact with this medicine?  This medicine may interact with the following medications:  · alcohol  · antihistamines for allergy, cough and cold  · antiviral medicines for HIV or AIDS  · atropine  · certain antibiotics like clarithromycin, erythromycin, linezolid, rifampin  · certain medicines for anxiety or sleep  · certain medicines for bladder problems like oxybutynin, tolterodine  · certain medicines for depression like amitriptyline, fluoxetine, sertraline  · certain medicines for fungal infections like ketoconazole, itraconazole, voriconazole  · certain medicines for migraine headache  like almotriptan, eletriptan, frovatriptan, naratriptan, rizatriptan, sumatriptan, zolmitriptan  · certain medicines for nausea or vomiting like dolasetron, ondansetron, palonosetron  · certain medicines for Parkinson's disease like benztropine, trihexyphenidyl  · certain medicines for seizures like phenobarbital, phenytoin, primidone  · certain medicines for stomach problems like dicyclomine, hyoscyamine  · certain medicines for travel sickness like scopolamine  · diuretics  · general anesthetics like halothane, isoflurane, methoxyflurane, propofol  · ipratropium  · local anesthetics like lidocaine, pramoxine, tetracaine  · MAOIs like Carbex, Eldepryl, Marplan, Nardil, and Parnate  · medicines that relax muscles for surgery  · methylene blue  · nilotinib  · other narcotic medicines for pain or cough  · phenothiazines like chlorpromazine, mesoridazine, prochlorperazine, thioridazine  This list may not describe all possible interactions. Give your health care provider a list of all the medicines, herbs, non-prescription drugs, or dietary supplements you use. Also tell them if you smoke, drink alcohol, or use illegal drugs. Some items may interact with your medicine.  What should I watch for while using this medicine?  Tell your doctor or health care professional if your pain does not go away, if it gets worse, or if you have new or a different type of pain. You may develop tolerance to the medicine. Tolerance means that you will need a higher dose of the medicine for pain relief. Tolerance is normal and is expected if you take this medicine for a long time.  Do not suddenly stop taking your medicine because you may develop a severe reaction. Your body becomes used to the medicine. This does NOT mean you are addicted. Addiction is a behavior related to getting and using a drug for a non-medical reason. If you have pain, you have a medical reason to take pain medicine. Your doctor will tell you how much medicine to  take. If your doctor wants you to stop the medicine, the dose will be slowly lowered over time to avoid any side effects.  There are different types of narcotic medicines (opiates). If you take more than one type at the same time or if you are taking another medicine that also causes drowsiness, you may have more side effects. Give your health care provider a list of all medicines you use. Your doctor will tell you how much medicine to take. Do not take more medicine than directed. Call emergency for help if you have problems breathing or unusual sleepiness.  You may get drowsy or dizzy. Do not drive, use machinery, or do anything that needs mental alertness until you know how the medicine affects you. Do not stand or sit up quickly, especially if you are an older patient. This reduces the risk of dizzy or fainting spells. Alcohol may interfere with the effect of this medicine. Avoid alcoholic drinks.  This medicine will cause constipation. Try to have a bowel movement at least every 2 to 3 days. If you do not have a bowel movement for 3 days, call your doctor or health care professional.  Your mouth may get dry. Chewing sugarless gum or sucking hard candy, and drinking plenty of water may help. Contact your doctor if the problem does not go away or is severe.  What side effects may I notice from receiving this medicine?  Side effects that you should report to your doctor or health care professional as soon as possible:  · allergic reactions like skin rash, itching or hives, swelling of the face, lips, or tongue  · breathing problems  · confusion  · signs and symptoms of low blood pressure like dizziness; feeling faint or lightheaded, falls; unusually weak or tired  · trouble passing urine or change in the amount of urine  · trouble swallowing  Side effects that usually do not require medical attention (report to your doctor or health care professional if they continue or are bothersome):  · constipation  · dry  mouth  · nausea, vomiting  · tiredness  This list may not describe all possible side effects. Call your doctor for medical advice about side effects. You may report side effects to FDA at 6-422-WSA-7407.  Where should I keep my medicine?  Keep out of the reach of children. This medicine can be abused. Keep your medicine in a safe place to protect it from theft. Do not share this medicine with anyone. Selling or giving away this medicine is dangerous and against the law.  Store at room temperature between 15 and 30 degrees C (59 and 86 degrees F). Protect from light. Keep container tightly closed.  This medicine may cause harm and death if it is taken by other adults, children, or pets. Return medicine that has not been used to an official disposal site. Contact the [facility] at [phone] or your [facility] government to find a site. If you cannot return the medicine, flush it down the toilet. Do not use the medicine after the expiration date.  NOTE: This sheet is a summary. It may not cover all possible information. If you have questions about this medicine, talk to your doctor, pharmacist, or health care provider.  © 2020 [employer] (2018-04-24 16:13:10)

## 2021-07-28 NOTE — DISCHARGE SUMMARY
Discharge Summary    CHIEF COMPLAINT ON ADMISSION  Chief Complaint   Patient presents with   • T-5000 GLF   • Hip Pain       Reason for Admission  EMS     Admission Date  7/25/2021    CODE STATUS  DNAR/DNI    HPI & HOSPITAL COURSE  This is a 66 y.o. female with PMHX of dyslipidemia who was admitted pn 7/25/21 after presenting to ER for ground level fall and right hip pain and was found to have right intertrochanteric right femur fracture. Patient was admitted for further treatment . Ortho was consulted and patient had intramedullary nailing on 7/25 by Dr. Todd.   Pysical therapy has seen patient and home health and walker has been recommended and will be arranged for patient.  Patient also requiring oxygen, she is a smoker and her CTA chest shows emphysema.Will arrange home oxygen for patient.  Patient will be discharged home today with home health and home O2   She will need to follow up with ortho in 10-14 days.     The patient met 2-midnight criteria for an inpatient stay at the time of discharge.    Discharge Date  7/28/21    FOLLOW UP ITEMS POST DISCHARGE  Ortho     DISCHARGE DIAGNOSES  Principal Problem:    Femur fracture (HCC) POA: Unknown  Active Problems:    Smoker POA: Yes    Mixed hyperlipidemia POA: Yes    Fall POA: Unknown    Hypoxia POA: Unknown  Resolved Problems:    * No resolved hospital problems. *      FOLLOW UP  Future Appointments   Date Time Provider Department Center   8/2/2021 12:00 PM [facility]   10/19/2021  8:00 AM [facility]     No follow-up provider specified.    MEDICATIONS ON DISCHARGE     Medication List      START taking these medications      Instructions   aspirin 325 MG Tabs  Commonly known as: ASA   Take 1 tablet by mouth 2 times a day for 28 days.  Dose: 325 mg     oxyCODONE immediate-release 5 MG Tabs  Commonly known as: ROXICODONE   Take 1 tablet by mouth every four hours as needed for up to 3 days.  Dose: 5 mg         CONTINUE taking these medications      Instructions   atorvastatin 20 MG Tabs  Commonly known as: LIPITOR   Take 1 Tab by mouth every day.  Dose: 20 mg            Allergies  Allergies   Allergen Reactions   • Bloodless      Bloodless by choice       DIET  Orders Placed This Encounter   Procedures   • Diet Order Diet: Regular     Standing Status:   Standing     Number of Occurrences:   1     Order Specific Question:   Diet:     Answer:   Regular [1]       ACTIVITY  As tolerated.  Weight bearing as tolerated    CONSULTATIONS  Ortho    PROCEDURES   Intramedullary nailing of right hip fracture    LABORATORY  Lab Results   Component Value Date    SODIUM 136 07/27/2021    POTASSIUM 3.8 07/27/2021    CHLORIDE 102 07/27/2021    CO2 27 07/27/2021    GLUCOSE 106 (H) 07/27/2021    BUN 13 07/27/2021    CREATININE 0.56 07/27/2021        Lab Results   Component Value Date    WBC 8.8 07/27/2021    HEMOGLOBIN 9.8 (L) 07/27/2021    HEMATOCRIT 29.8 (L) 07/27/2021    PLATELETCT 161 (L) 07/27/2021        Total time of the discharge process exceeds 41 minutes.

## 2021-07-28 NOTE — PROGRESS NOTES
"/60   Pulse 91   Temp 36.9 °C (98.5 °F) (Temporal)   Resp 16   Ht 1.676 m (5' 6\")   Wt 59.8 kg (131 lb 13.4 oz)   SpO2 90%     Pt AAOx4, VSS on 2L O2. Discharge education provided, discussed follow-up appointments and prescriptions. No questions or concerns at this time. Pt discharged home with belongings, no IV. Pt left with FWW and home O2. AVS and consent for controlled substance signed by pt.  "

## 2021-07-28 NOTE — DISCHARGE PLANNING
"RNCM received a call back from patient's PCP, patient has an appt on August 2nd.    Agency/Facility Name: [facility]  Spoke To: Juloi  Outcome: DPA called stating the pt has an appt on Aug 2nd.  [facility] can't see the pt until after their appt. The rule is the pt has to have been to their PCP in the last 90 days.   JULIO stated the pt saw her PCP last Fall and is going next week.  Pt is established and asked Julio \"who see's their PCP every 90 days.  Julio stated \"that is the rule\".  JULIO stated, cancel the referral, we will send to another  agency.    Received Choice form at   Agency/Facility Name: [facility]   Referral sent per Choice form @ 1348        "

## 2021-07-28 NOTE — CARE PLAN
The patient is Stable - Low risk of patient condition declining or worsening    Shift Goals  Clinical Goals: Pain Management    Progress made toward(s) clinical / shift goals:  Patient able to verbalize pain on a 0/10 scale. Pain being controlled with rest and range of motion exercises.     Patient is not progressing towards the following goals:

## 2021-07-28 NOTE — DISCHARGE PLANNING
Received message from Janette at [facility], pt has appt scheduled for 8/2/21.TThey will follow for HH.

## 2021-07-28 NOTE — DISCHARGE PLANNING
Anticipated Discharge Disposition: Home with HH, 02 and FWW.    Action: Pt has been cleared for discharge. Needs home 02, referred to [facility] [place]. Pt will discharge to home today.    Barriers to Discharge: Pending HH accept and 02 delivery.    Plan: Discharge to home with HH and DME.

## 2021-07-28 NOTE — CARE PLAN
Problem: Pain - Standard  Goal: Alleviation of pain or a reduction in pain to the patient’s comfort goal  Outcome: Progressing     Problem: Fall Risk  Goal: Patient will remain free from falls  Outcome: Progressing   The patient is Stable - Low risk of patient condition declining or worsening    Shift Goals  Clinical Goals: Pain control  Patient Goals: Increase mobility  Family Goals: n/a    Progress made toward(s) clinical / shift goals: Pt was educated about the need to call for assistance when need to ambulate due to leg stiffness on her right side.     Patient is not progressing towards the following goals:

## 2021-08-02 ENCOUNTER — APPOINTMENT (OUTPATIENT)
Dept: MEDICAL GROUP | Facility: PHYSICIAN GROUP | Age: 66
End: 2021-08-02
Payer: MEDICARE

## 2021-08-03 ENCOUNTER — OFFICE VISIT (OUTPATIENT)
Dept: MEDICAL GROUP | Facility: PHYSICIAN GROUP | Age: 66
End: 2021-08-03
Payer: MEDICARE

## 2021-08-03 VITALS
DIASTOLIC BLOOD PRESSURE: 70 MMHG | BODY MASS INDEX: 18.19 KG/M2 | SYSTOLIC BLOOD PRESSURE: 104 MMHG | WEIGHT: 109.2 LBS | RESPIRATION RATE: 12 BRPM | TEMPERATURE: 98.6 F | HEART RATE: 104 BPM | OXYGEN SATURATION: 95 % | HEIGHT: 65 IN

## 2021-08-03 DIAGNOSIS — M81.0 AGE-RELATED OSTEOPOROSIS WITHOUT CURRENT PATHOLOGICAL FRACTURE: ICD-10-CM

## 2021-08-03 DIAGNOSIS — Z12.11 SCREENING FOR COLON CANCER: ICD-10-CM

## 2021-08-03 DIAGNOSIS — F17.200 SMOKER: ICD-10-CM

## 2021-08-03 DIAGNOSIS — S72.91XA CLOSED FRACTURE OF RIGHT FEMUR, UNSPECIFIED FRACTURE MORPHOLOGY, UNSPECIFIED PORTION OF FEMUR, INITIAL ENCOUNTER (HCC): ICD-10-CM

## 2021-08-03 PROCEDURE — 99213 OFFICE O/P EST LOW 20 MIN: CPT | Mod: 25 | Performed by: PHYSICIAN ASSISTANT

## 2021-08-03 PROCEDURE — 99406 BEHAV CHNG SMOKING 3-10 MIN: CPT | Performed by: PHYSICIAN ASSISTANT

## 2021-08-03 RX ORDER — ALENDRONATE SODIUM 70 MG/1
70 TABLET ORAL
Qty: 12 TABLET | Refills: 3 | Status: SHIPPED | OUTPATIENT
Start: 2021-08-03 | End: 2022-08-03

## 2021-08-03 ASSESSMENT — PATIENT HEALTH QUESTIONNAIRE - PHQ9: CLINICAL INTERPRETATION OF PHQ2 SCORE: 0

## 2021-08-03 ASSESSMENT — FIBROSIS 4 INDEX: FIB4 SCORE: 2.35

## 2021-08-03 NOTE — PROGRESS NOTES
CC:  Chief Complaint   Patient presents with   • Hospital Follow-up       HISTORY OF PRESENT ILLNESS: Patient is a 66 y.o. female established patient presenting for hospital f/u.   1. Pt was carrying groceries in her garage and fell on the floor from having to carry too many things. She landed on her R hip and was taken by ambulance to the ER. She was diagnosed with R trochanteric hip fracture. Had surgical repair and will be following up with Dr Todd next week. Her DEXA scan from 12/2020 was significant for osteoporosis of her L hip with T score of -2.5.   2. Pt is smoker for many years and CTA of chest in hospital revealed emphyema. She continues to smoke so she was started on and discharged with home O2. She has not used it at all since being a home. States she is working on her smoking and has reduced a lot recently. She understands that smoking is a risk factor for osteoporosis.     No problem-specific Assessment & Plan notes found for this encounter.      Patient Active Problem List    Diagnosis Date Noted   • Hypoxia 07/26/2021   • Fall 07/25/2021   • Femur fracture (HCC) 07/25/2021   • Smoker 10/19/2020   • Vaginal atrophy 10/19/2020   • Mixed hyperlipidemia 10/19/2020      Allergies:Bloodless    Current Outpatient Medications   Medication Sig Dispense Refill   • aspirin (ASA) 325 MG Tab Take 1 tablet by mouth 2 times a day for 28 days. 56 tablet 0   • atorvastatin (LIPITOR) 20 MG Tab Take 1 Tab by mouth every day. 90 Tab 3     No current facility-administered medications for this visit.       Social History     Tobacco Use   • Smoking status: Current Every Day Smoker     Packs/day: 0.50     Years: 50.00     Pack years: 25.00     Types: Cigarettes   • Smokeless tobacco: Never Used   Vaping Use   • Vaping Use: Never used   Substance Use Topics   • Alcohol use: No   • Drug use: No     Social History     Social History Narrative   • Not on file       No family history on file.     ROS:     - Constitutional:   "Negative for fever, chills, unexpected weight change, and fatigue/generalized weakness.    - HEENT:  Negative for headaches, vision changes, hearing changes, ear pain, ear discharge, rhinorrhea, sinus congestion, sore throat, and neck pain.      - Respiratory: Negative for cough, sputum production, chest congestion, dyspnea, wheezing, and crackles.      - Cardiovascular: Negative for chest pain, palpitations, orthopnea, and bilateral lower extremity edema.     - Gastrointestinal: Negative for heartburn, nausea, vomiting, abdominal pain, hematochezia, melena, diarrhea, constipation, and greasy/foul-smelling stools.     - Genitourinary: Negative for dysuria, polyuria, hematuria, pyuria, urinary urgency, and urinary incontinence.     - Musculoskeletal:Positive for L hip pain.  Negative for myalgias, back pain,     - Skin: Negative for rash, itching, cyanotic skin color change.     - Neurological:Positive for impaired gait. Negative for dizziness, tingling, tremors, focal sensory deficit, focal weakness and headaches.       Exam:    /70   Pulse (!) 104   Temp 37 °C (98.6 °F) (Temporal)   Resp 12   Ht 1.651 m (5' 5\")   Wt 49.5 kg (109 lb 3.2 oz)   SpO2 95%  Body mass index is 18.17 kg/m².    General:  Well nourished, well developed female in NAD  Head is grossly normal.  Neck: Supple. Thyroid is not enlarged.  Skin: Warm and dry. No obvious lesions  Neuro: Normal muscle tone. Ambulates with walker. Alert and oriented.  Psych: Normal mood and affect      Please note that this dictation was created using voice recognition software. I have made every reasonable attempt to correct obvious errors, but I expect that there are errors of grammar and possibly content that I did not discover before finalizing the note.      Assessment/Plan:    1. Screening for colon cancer  Order placed.  - COLOGUARD (FIT DNA)    2. Closed fracture of right femur, unspecified fracture morphology, unspecified portion of femur, initial " encounter ([facility])  She will f/u with Dr Todd for further management of this and staple removal    3. Age-related osteoporosis without current pathological fracture  I instructed her to start taking Vit D and Ca supplements if dietary intake not adequate. Continue with weight bearing exercise. Will start on fosamax and explained that typically will recheck DEXA in another 2 years. Advised she needs to be cautious because of her risk for repeat fractures.   - alendronate (FOSAMAX) 70 MG Tab; Take 1 tablet by mouth every 7 days.  Dispense: 12 tablet; Refill: 3    4. Smoker  Smoking cessation counseling: 10 minutes including discussion of various products available to assist with this endeavor.  Discussion of triggers to smoke and reasons to quit.

## 2021-10-12 DIAGNOSIS — E78.2 MIXED HYPERLIPIDEMIA: ICD-10-CM

## 2021-10-12 RX ORDER — ATORVASTATIN CALCIUM 20 MG/1
TABLET, FILM COATED ORAL
Qty: 90 TABLET | Refills: 3 | Status: SHIPPED | OUTPATIENT
Start: 2021-10-12 | End: 2022-11-14

## 2022-08-11 RX ORDER — ALENDRONATE SODIUM 70 MG/1
TABLET ORAL
Qty: 12 TABLET | Refills: 1 | Status: SHIPPED | OUTPATIENT
Start: 2022-08-11 | End: 2023-02-13

## 2022-08-11 NOTE — TELEPHONE ENCOUNTER
Received request via: Pharmacy    Was the patient seen in the last year in this department? No    Does the patient have an active prescription (recently filled or refills available) for medication(s) requested? No

## 2022-10-28 ENCOUNTER — TELEPHONE (OUTPATIENT)
Dept: SCHEDULING | Facility: IMAGING CENTER | Age: 67
End: 2022-10-28

## 2022-11-13 DIAGNOSIS — E78.2 MIXED HYPERLIPIDEMIA: ICD-10-CM

## 2022-11-14 RX ORDER — ATORVASTATIN CALCIUM 20 MG/1
TABLET, FILM COATED ORAL
Qty: 90 TABLET | Refills: 2 | Status: SHIPPED | OUTPATIENT
Start: 2022-11-14

## 2022-11-14 NOTE — TELEPHONE ENCOUNTER
Received request via: Pharmacy    Was the patient seen in the last year in this department? No    Does the patient have an active prescription (recently filled or refills available) for medication(s) requested? No    Does the patient have longterm Plus and need 100 day supply (blood pressure, diabetes and cholesterol meds only)? Patient does not have SCP     No follow up appt

## 2023-02-13 RX ORDER — ALENDRONATE SODIUM 70 MG/1
TABLET ORAL
Qty: 12 TABLET | Refills: 1 | Status: SHIPPED | OUTPATIENT
Start: 2023-02-13

## 2023-02-13 NOTE — TELEPHONE ENCOUNTER
Received request via: Pharmacy    Was the patient seen in the last year in this department? No    Does the patient have an active prescription (recently filled or refills available) for medication(s) requested? No    Does the patient have USP Plus and need 100 day supply (blood pressure, diabetes and cholesterol meds only)? Patient does not have SCP

## 2023-02-15 ENCOUNTER — TELEPHONE (OUTPATIENT)
Dept: MEDICAL GROUP | Facility: PHYSICIAN GROUP | Age: 68
End: 2023-02-15
Payer: COMMERCIAL

## (undated) DEVICE — TOWELS CLOTH SURGICAL - (4/PK 20PK/CA)

## (undated) DEVICE — SUTURE 0 VICRYL PLUS CT-1 - 8 X 18 INCH (12/BX)

## (undated) DEVICE — PROTECTOR ULNA NERVE - (36PR/CA)

## (undated) DEVICE — GLOVE BIOGEL PI INDICATOR SZ 7.5 SURGICAL PF LF -(50/BX 4BX/CA)

## (undated) DEVICE — GLOVE BIOGEL INDICATOR SZ 8 SURGICAL PF LTX - (50/BX 4BX/CA)

## (undated) DEVICE — NEPTUNE 4 PORT MANIFOLD - (20/PK)

## (undated) DEVICE — PACK MAJOR ORTHO - (2EA/CA)

## (undated) DEVICE — CANISTER SUCTION 3000ML MECHANICAL FILTER AUTO SHUTOFF MEDI-VAC NONSTERILE LF DISP  (40EA/CA)

## (undated) DEVICE — GLOVE SZ 7.5 BIOGEL PI MICRO - PF LF (50PR/BX)

## (undated) DEVICE — SLEEVE STERILE  A599T - 30/BX 2BX/CS

## (undated) DEVICE — STAPLER SKIN DISP - (6/BX 10BX/CA) VISISTAT

## (undated) DEVICE — LACTATED RINGERS INJ 1000 ML - (14EA/CA 60CA/PF)

## (undated) DEVICE — SUTURE 2-0 VICRYL PLUS CT-1 - 8 X 18 INCH(12/BX)

## (undated) DEVICE — GUIDE PIN CALIBRATED (5EA/PK) (4TX6=24)

## (undated) DEVICE — SUCTION INSTRUMENT YANKAUER BULBOUS TIP W/O VENT (50EA/CA)

## (undated) DEVICE — ROD GUIDE BALL TIP 3.0MM X 1000MM

## (undated) DEVICE — CHLORAPREP 26 ML APPLICATOR - ORANGE TINT(25/CA)

## (undated) DEVICE — SET EXTENSION WITH 2 PORTS (48EA/CA) ***PART #2C8610 IS A SUBSTITUTE*****

## (undated) DEVICE — GOWN SURGICAL X-LARGE ULTRA - FILM-REINFORCED (20/CA)

## (undated) DEVICE — TUBING CLEARLINK DUO-VENT - C-FLO (48EA/CA)

## (undated) DEVICE — SUTURE GENERAL

## (undated) DEVICE — HEAD HOLDER JUNIOR/ADULT

## (undated) DEVICE — SET LEADWIRE 5 LEAD BEDSIDE DISPOSABLE ECG (1SET OF 5/EA)

## (undated) DEVICE — BIT DRILL LONG CALIBRATED 4.2MM X 330MM (4TX2=8)

## (undated) DEVICE — DRAPE LARGE 3 QUARTER - (20/CA)

## (undated) DEVICE — DRAPE C-ARM LARGE 41IN X 74 IN - (10/BX 2BX/CA)

## (undated) DEVICE — ELECTRODE 850 FOAM ADHESIVE - HYDROGEL RADIOTRNSPRNT (50/PK)

## (undated) DEVICE — MASK ANESTHESIA ADULT  - (100/CA)

## (undated) DEVICE — ELECTRODE DUAL RETURN W/ CORD - (50/PK)

## (undated) DEVICE — TUBE CONNECT SUCTION CLEAR 120 X 1/4" (50EA/CA)"

## (undated) DEVICE — TOWEL STOP TIMEOUT SAFETY FLAG (40EA/CA)

## (undated) DEVICE — SENSOR SPO2 NEO LNCS ADHESIVE (20/BX) SEE USER NOTES

## (undated) DEVICE — KIT ANESTHESIA W/CIRCUIT & 3/LT BAG W/FILTER (20EA/CA)

## (undated) DEVICE — GLOVE BIOGEL SZ 7.5 SURGICAL PF LTX - (50PR/BX 4BX/CA)

## (undated) DEVICE — DRESSING XEROFORM 1X8 - (50/BX 4BX/CA)

## (undated) DEVICE — GLOVE BIOGEL PI ORTHO SZ 7 PF LF (40PR/BX)

## (undated) DEVICE — PENCIL ELECTSURG 10FT BTN SWH - (50/CA)

## (undated) DEVICE — GOWN WARMING STANDARD FLEX - (30/CA)